# Patient Record
Sex: FEMALE | Race: WHITE | NOT HISPANIC OR LATINO | Employment: PART TIME | URBAN - METROPOLITAN AREA
[De-identification: names, ages, dates, MRNs, and addresses within clinical notes are randomized per-mention and may not be internally consistent; named-entity substitution may affect disease eponyms.]

---

## 2022-07-01 ENCOUNTER — OFFICE VISIT (OUTPATIENT)
Dept: URGENT CARE | Facility: MEDICAL CENTER | Age: 62
End: 2022-07-01
Payer: COMMERCIAL

## 2022-07-01 VITALS
BODY MASS INDEX: 36.24 KG/M2 | HEIGHT: 57 IN | OXYGEN SATURATION: 96 % | WEIGHT: 168 LBS | SYSTOLIC BLOOD PRESSURE: 158 MMHG | RESPIRATION RATE: 18 BRPM | DIASTOLIC BLOOD PRESSURE: 86 MMHG | HEART RATE: 103 BPM | TEMPERATURE: 101.9 F

## 2022-07-01 DIAGNOSIS — R53.83 FATIGUE, UNSPECIFIED TYPE: ICD-10-CM

## 2022-07-01 DIAGNOSIS — L03.312 CELLULITIS OF MID BACK REGION: ICD-10-CM

## 2022-07-01 DIAGNOSIS — R50.9 FEVER, UNSPECIFIED FEVER CAUSE: ICD-10-CM
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
## 2022-07-01 DIAGNOSIS — S20.461A INSECT BITE (NONVENOMOUS) OF RIGHT BACK WALL OF THORAX, INITIAL ENCOUNTER: Primary | ICD-10-CM

## 2022-07-01 PROCEDURE — U0003 INFECTIOUS AGENT DETECTION BY NUCLEIC ACID (DNA OR RNA); SEVERE ACUTE RESPIRATORY SYNDROME CORONAVIRUS 2 (SARS-COV-2) (CORONAVIRUS DISEASE [COVID-19]), AMPLIFIED PROBE TECHNIQUE, MAKING USE OF HIGH THROUGHPUT TECHNOLOGIES AS DESCRIBED BY CMS-2020-01-R: HCPCS | Performed by: PHYSICIAN ASSISTANT

## 2022-07-01 PROCEDURE — 99213 OFFICE O/P EST LOW 20 MIN: CPT | Performed by: PHYSICIAN ASSISTANT

## 2022-07-01 PROCEDURE — U0005 INFEC AGEN DETEC AMPLI PROBE: HCPCS | Performed by: PHYSICIAN ASSISTANT

## 2022-07-01 RX ORDER — DOXYCYCLINE HYCLATE 100 MG/1
100 CAPSULE ORAL EVERY 12 HOURS SCHEDULED
Qty: 20 CAPSULE | Refills: 0 | Status: SHIPPED | OUTPATIENT
Start: 2022-07-01 | End: 2022-07-11

## 2022-07-01 NOTE — PATIENT INSTRUCTIONS
1  Over-the-counter ibuprofen and/or acetaminophen as needed for pain and fever  2  Apply warm compresses to the affected area 3-4 times daily for 20-30 minutes until improved  3  Go to the ER immediately for any worsening symptoms  4  Follow-up with your primary care doctor in 1 week for any persistent symptoms    5  Quarantine pending the results of your COVID test

## 2022-07-01 NOTE — PROGRESS NOTES
[facility]        NAME: Kevin Clements is a 60 y o  female  : 1960    MRN: [id number]  DATE: 2022  TIME: 1:50 PM    Assessment and Plan   Insect bite (nonvenomous) of right back wall of thorax, initial encounter [S20 461A, W57  XXXA]  1  Insect bite (nonvenomous) of right back wall of thorax, initial encounter     2  Cellulitis of mid back region  doxycycline hyclate (VIBRAMYCIN) 100 mg capsule   3  Fever, unspecified fever cause  COVID Only -Office Collect   4  Fatigue, unspecified type  COVID Only -Office Collect         Patient Instructions   1  Over-the-counter ibuprofen and/or acetaminophen as needed for pain and fever  2  Apply warm compresses to the affected area 3-4 times daily for 20-30 minutes until improved  3  Go to the ER immediately for any worsening symptoms  4  Follow-up with your primary care doctor in 1 week for any persistent symptoms  5  Quarantine pending the results of your COVID test     Chief Complaint     Chief Complaint   Patient presents with    Insect Bite     Started Monday with a possible bug bite to the right lowerback, pt states she has been feeling achy, sweats and fatigued  Bump is raised  History of Present Illness       79-year-old female patient who noticed a small red area on the posterior part of her right torso 4 days ago  This occurred after sitting out on her deck  Since then the areas become bigger, more firm, slightly tender  She states coworkers pressed on the area yesterday and it emitted pus  Today patient developed fever, fatigue, body aches      Review of Systems   Review of Systems   Constitutional: Positive for chills, fatigue and fever  HENT: Negative for ear pain and sore throat  Eyes: Negative for pain and visual disturbance  Respiratory: Negative for cough and shortness of breath  Cardiovascular: Negative for chest pain and palpitations  Gastrointestinal: Negative for abdominal pain and vomiting  Genitourinary: Negative for dysuria and hematuria  Musculoskeletal: Negative for arthralgias and back pain  Skin: Positive for rash and wound  Negative for color change  Neurological: Negative for seizures and syncope  All other systems reviewed and are negative  Current Medications       Current Outpatient Medications:     doxycycline hyclate (VIBRAMYCIN) 100 mg capsule, Take 1 capsule (100 mg total) by mouth every 12 (twelve) hours for 10 days, Disp: 20 capsule, Rfl: 0    Current Allergies     Allergies as of 07/01/2022    (No Known Allergies)            The following portions of the patient's history were reviewed and updated as appropriate: allergies, current medications, past family history, past medical history, past social history, past surgical history and problem list      History reviewed  No pertinent past medical history  History reviewed  No pertinent surgical history  Family History   Problem Relation Age of Onset    No Known Problems Mother     No Known Problems Father          Medications have been verified  Objective   /86   Pulse 103   Temp (!) 101 9 °F (38 8 °C) (Oral)   Resp 18   Ht 4' 9" (1 448 m)   Wt 76 2 kg (168 lb)   SpO2 96%   BMI 36 35 kg/m²        Physical Exam     Physical Exam  Vitals and nursing note reviewed  Constitutional:       General: She is not in acute distress  Appearance: Normal appearance  She is not ill-appearing  HENT:      Head: Normocephalic  Nose: Nose normal       Mouth/Throat:      Mouth: Mucous membranes are moist       Pharynx: Oropharynx is clear  Eyes:      Conjunctiva/sclera: Conjunctivae normal       Pupils: Pupils are equal, round, and reactive to light  Cardiovascular:      Rate and Rhythm: Normal rate and regular rhythm  Pulses: Normal pulses  Heart sounds: Normal heart sounds  Pulmonary:      Effort: Pulmonary effort is normal    Abdominal:      Tenderness:  There is no abdominal tenderness  Musculoskeletal:         General: Normal range of motion  Cervical back: Normal range of motion  Skin:     General: Skin is warm and dry  Capillary Refill: Capillary refill takes less than 2 seconds  Comments: 4 cm x 2 cm oval, red, raised, indurated, warm rash noted to the right posterior thorax  Although no palpable fluctuance, there are several superficial pustules on the area as well  No red streaking or drainage noted  Neurological:      General: No focal deficit present  Mental Status: She is alert and oriented to person, place, and time  Psychiatric:         Mood and Affect: Mood normal          Behavior: Behavior normal            Medical decision making note:   I suspect the other symptoms are likely due to a cellulitis secondary to a previous bug bite as noted  However, will check a COVID swab due to the severity of the fever just to be certain

## 2022-07-02 LAB — SARS-COV-2 RNA RESP QL NAA+PROBE: NEGATIVE

## 2025-02-20 ENCOUNTER — APPOINTMENT (EMERGENCY)
Dept: RADIOLOGY | Facility: HOSPITAL | Age: 65
DRG: 494 | End: 2025-02-20
Payer: MEDICARE

## 2025-02-20 ENCOUNTER — HOSPITAL ENCOUNTER (INPATIENT)
Facility: HOSPITAL | Age: 65
LOS: 1 days | Discharge: HOME HEALTH | DRG: 494 | End: 2025-02-22
Attending: EMERGENCY MEDICINE | Admitting: SURGERY
Payer: MEDICARE

## 2025-02-20 DIAGNOSIS — S82.851A CLOSED TRIMALLEOLAR FRACTURE OF RIGHT ANKLE, INITIAL ENCOUNTER: Primary | ICD-10-CM

## 2025-02-20 LAB
ANION GAP SERPL CALCULATED.3IONS-SCNC: 13 MMOL/L (ref 4–13)
BASOPHILS # BLD AUTO: 0.04 THOUSANDS/ΜL (ref 0–0.1)
BASOPHILS NFR BLD AUTO: 1 % (ref 0–1)
BUN SERPL-MCNC: 12 MG/DL (ref 5–25)
CALCIUM SERPL-MCNC: 9.4 MG/DL (ref 8.4–10.2)
CHLORIDE SERPL-SCNC: 101 MMOL/L (ref 96–108)
CO2 SERPL-SCNC: 23 MMOL/L (ref 21–32)
CREAT SERPL-MCNC: 0.75 MG/DL (ref 0.6–1.3)
EOSINOPHIL # BLD AUTO: 0.14 THOUSAND/ΜL (ref 0–0.61)
EOSINOPHIL NFR BLD AUTO: 2 % (ref 0–6)
ERYTHROCYTE [DISTWIDTH] IN BLOOD BY AUTOMATED COUNT: 13.5 % (ref 11.6–15.1)
GFR SERPL CREATININE-BSD FRML MDRD: 83 ML/MIN/1.73SQ M
GLUCOSE SERPL-MCNC: 98 MG/DL (ref 65–140)
HCT VFR BLD AUTO: 44.5 % (ref 34.8–46.1)
HGB BLD-MCNC: 14.8 G/DL (ref 11.5–15.4)
IMM GRANULOCYTES # BLD AUTO: 0.02 THOUSAND/UL (ref 0–0.2)
IMM GRANULOCYTES NFR BLD AUTO: 0 % (ref 0–2)
LYMPHOCYTES # BLD AUTO: 2.84 THOUSANDS/ΜL (ref 0.6–4.47)
LYMPHOCYTES NFR BLD AUTO: 34 % (ref 14–44)
MCH RBC QN AUTO: 32.8 PG (ref 26.8–34.3)
MCHC RBC AUTO-ENTMCNC: 33.3 G/DL (ref 31.4–37.4)
MCV RBC AUTO: 99 FL (ref 82–98)
MONOCYTES # BLD AUTO: 0.97 THOUSAND/ΜL (ref 0.17–1.22)
MONOCYTES NFR BLD AUTO: 12 % (ref 4–12)
NEUTROPHILS # BLD AUTO: 4.24 THOUSANDS/ΜL (ref 1.85–7.62)
NEUTS SEG NFR BLD AUTO: 51 % (ref 43–75)
NRBC BLD AUTO-RTO: 0 /100 WBCS
PLATELET # BLD AUTO: 162 THOUSANDS/UL (ref 149–390)
PMV BLD AUTO: 10.3 FL (ref 8.9–12.7)
POTASSIUM SERPL-SCNC: 4.5 MMOL/L (ref 3.5–5.3)
RBC # BLD AUTO: 4.51 MILLION/UL (ref 3.81–5.12)
SODIUM SERPL-SCNC: 137 MMOL/L (ref 135–147)
WBC # BLD AUTO: 8.25 THOUSAND/UL (ref 4.31–10.16)

## 2025-02-20 PROCEDURE — 80048 BASIC METABOLIC PNL TOTAL CA: CPT

## 2025-02-20 PROCEDURE — 99284 EMERGENCY DEPT VISIT MOD MDM: CPT

## 2025-02-20 PROCEDURE — 85025 COMPLETE CBC W/AUTO DIFF WBC: CPT

## 2025-02-20 PROCEDURE — 73590 X-RAY EXAM OF LOWER LEG: CPT

## 2025-02-20 PROCEDURE — 36415 COLL VENOUS BLD VENIPUNCTURE: CPT

## 2025-02-20 PROCEDURE — 73610 X-RAY EXAM OF ANKLE: CPT

## 2025-02-20 PROCEDURE — 96374 THER/PROPH/DIAG INJ IV PUSH: CPT

## 2025-02-20 RX ORDER — FENTANYL CITRATE 50 UG/ML
75 INJECTION, SOLUTION INTRAMUSCULAR; INTRAVENOUS ONCE
Refills: 0 | Status: COMPLETED | OUTPATIENT
Start: 2025-02-20 | End: 2025-02-20

## 2025-02-20 RX ORDER — PROPOFOL 10 MG/ML
200 INJECTION, EMULSION INTRAVENOUS ONCE
Status: COMPLETED | OUTPATIENT
Start: 2025-02-20 | End: 2025-02-21

## 2025-02-20 RX ADMIN — FENTANYL CITRATE 75 MCG: 50 INJECTION, SOLUTION INTRAMUSCULAR; INTRAVENOUS at 21:36

## 2025-02-21 ENCOUNTER — APPOINTMENT (INPATIENT)
Dept: RADIOLOGY | Facility: HOSPITAL | Age: 65
DRG: 494 | End: 2025-02-21
Payer: MEDICARE

## 2025-02-21 ENCOUNTER — APPOINTMENT (EMERGENCY)
Dept: RADIOLOGY | Facility: HOSPITAL | Age: 65
DRG: 494 | End: 2025-02-21
Payer: MEDICARE

## 2025-02-21 ENCOUNTER — ANESTHESIA (INPATIENT)
Dept: PERIOP | Facility: HOSPITAL | Age: 65
DRG: 494 | End: 2025-02-21
Payer: MEDICARE

## 2025-02-21 ENCOUNTER — ANESTHESIA EVENT (INPATIENT)
Dept: PERIOP | Facility: HOSPITAL | Age: 65
DRG: 494 | End: 2025-02-21
Payer: MEDICARE

## 2025-02-21 PROBLEM — G89.11 ACUTE PAIN DUE TO TRAUMA: Status: ACTIVE | Noted: 2025-02-21
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
## 2025-02-21 PROBLEM — S82.851D CLOSED TRIMALLEOLAR FRACTURE OF RIGHT ANKLE WITH ROUTINE HEALING: Status: ACTIVE | Noted: 2025-02-21

## 2025-02-21 LAB
ABO GROUP BLD: NORMAL
ABO GROUP BLD: NORMAL
ANION GAP SERPL CALCULATED.3IONS-SCNC: 11 MMOL/L (ref 4–13)
ATRIAL RATE: 89 BPM
ATRIAL RATE: 90 BPM
BLD GP AB SCN SERPL QL: NEGATIVE
BUN SERPL-MCNC: 11 MG/DL (ref 5–25)
CALCIUM SERPL-MCNC: 8.9 MG/DL (ref 8.4–10.2)
CHLORIDE SERPL-SCNC: 105 MMOL/L (ref 96–108)
CO2 SERPL-SCNC: 22 MMOL/L (ref 21–32)
CREAT SERPL-MCNC: 0.6 MG/DL (ref 0.6–1.3)
ERYTHROCYTE [DISTWIDTH] IN BLOOD BY AUTOMATED COUNT: 13.7 % (ref 11.6–15.1)
GFR SERPL CREATININE-BSD FRML MDRD: 95 ML/MIN/1.73SQ M
GLUCOSE SERPL-MCNC: 105 MG/DL (ref 65–140)
HCT VFR BLD AUTO: 42.2 % (ref 34.8–46.1)
HGB BLD-MCNC: 14.1 G/DL (ref 11.5–15.4)
INR PPP: 1.01 (ref 0.85–1.19)
MCH RBC QN AUTO: 32.9 PG (ref 26.8–34.3)
MCHC RBC AUTO-ENTMCNC: 33.4 G/DL (ref 31.4–37.4)
MCV RBC AUTO: 99 FL (ref 82–98)
P AXIS: 77 DEGREES
P AXIS: 77 DEGREES
PLATELET # BLD AUTO: 153 THOUSANDS/UL (ref 149–390)
PMV BLD AUTO: 10.4 FL (ref 8.9–12.7)
POTASSIUM SERPL-SCNC: 4.1 MMOL/L (ref 3.5–5.3)
PR INTERVAL: 164 MS
PR INTERVAL: 164 MS
PROTHROMBIN TIME: 14 SECONDS (ref 12.3–15)
QRS AXIS: -39 DEGREES
QRS AXIS: -41 DEGREES
QRSD INTERVAL: 90 MS
QRSD INTERVAL: 90 MS
QT INTERVAL: 388 MS
QT INTERVAL: 392 MS
QTC INTERVAL: 473 MS
QTC INTERVAL: 480 MS
RBC # BLD AUTO: 4.28 MILLION/UL (ref 3.81–5.12)
RH BLD: NEGATIVE
RH BLD: NEGATIVE
SODIUM SERPL-SCNC: 138 MMOL/L (ref 135–147)
SPECIMEN EXPIRATION DATE: NORMAL
T WAVE AXIS: 72 DEGREES
T WAVE AXIS: 73 DEGREES
VENTRICULAR RATE: 89 BPM
VENTRICULAR RATE: 90 BPM
WBC # BLD AUTO: 8.86 THOUSAND/UL (ref 4.31–10.16)

## 2025-02-21 PROCEDURE — C1713 ANCHOR/SCREW BN/BN,TIS/BN: HCPCS | Performed by: STUDENT IN AN ORGANIZED HEALTH CARE EDUCATION/TRAINING PROGRAM

## 2025-02-21 PROCEDURE — 86850 RBC ANTIBODY SCREEN: CPT

## 2025-02-21 PROCEDURE — 80048 BASIC METABOLIC PNL TOTAL CA: CPT

## 2025-02-21 PROCEDURE — 96376 TX/PRO/DX INJ SAME DRUG ADON: CPT

## 2025-02-21 PROCEDURE — 73610 X-RAY EXAM OF ANKLE: CPT

## 2025-02-21 PROCEDURE — NC001 PR NO CHARGE: Performed by: SURGERY

## 2025-02-21 PROCEDURE — 73600 X-RAY EXAM OF ANKLE: CPT

## 2025-02-21 PROCEDURE — 86900 BLOOD TYPING SEROLOGIC ABO: CPT

## 2025-02-21 PROCEDURE — 85610 PROTHROMBIN TIME: CPT | Performed by: PHYSICIAN ASSISTANT

## 2025-02-21 PROCEDURE — 93010 ELECTROCARDIOGRAM REPORT: CPT | Performed by: INTERNAL MEDICINE

## 2025-02-21 PROCEDURE — 71045 X-RAY EXAM CHEST 1 VIEW: CPT

## 2025-02-21 PROCEDURE — 99223 1ST HOSP IP/OBS HIGH 75: CPT | Performed by: STUDENT IN AN ORGANIZED HEALTH CARE EDUCATION/TRAINING PROGRAM

## 2025-02-21 PROCEDURE — 93005 ELECTROCARDIOGRAM TRACING: CPT

## 2025-02-21 PROCEDURE — 85027 COMPLETE CBC AUTOMATED: CPT

## 2025-02-21 PROCEDURE — NC001 PR NO CHARGE: Performed by: PHYSICIAN ASSISTANT

## 2025-02-21 PROCEDURE — 99223 1ST HOSP IP/OBS HIGH 75: CPT | Performed by: SURGERY

## 2025-02-21 PROCEDURE — 86901 BLOOD TYPING SEROLOGIC RH(D): CPT

## 2025-02-21 PROCEDURE — 0QSG04Z REPOSITION RIGHT TIBIA WITH INTERNAL FIXATION DEVICE, OPEN APPROACH: ICD-10-PCS | Performed by: STUDENT IN AN ORGANIZED HEALTH CARE EDUCATION/TRAINING PROGRAM

## 2025-02-21 PROCEDURE — 0QSJ04Z REPOSITION RIGHT FIBULA WITH INTERNAL FIXATION DEVICE, OPEN APPROACH: ICD-10-PCS | Performed by: STUDENT IN AN ORGANIZED HEALTH CARE EDUCATION/TRAINING PROGRAM

## 2025-02-21 PROCEDURE — 27823 TREATMENT OF ANKLE FRACTURE: CPT | Performed by: STUDENT IN AN ORGANIZED HEALTH CARE EDUCATION/TRAINING PROGRAM

## 2025-02-21 DEVICE — ONE-THIRD TUBULAR PLATE: Type: IMPLANTABLE DEVICE | Site: ANKLE | Status: FUNCTIONAL

## 2025-02-21 DEVICE — LOCKING SCREW, FULLY THREADED,T8
Type: IMPLANTABLE DEVICE | Site: ANKLE | Status: FUNCTIONAL
Brand: VARIAX

## 2025-02-21 DEVICE — BONE SCREW, T8
Type: IMPLANTABLE DEVICE | Site: ANKLE | Status: FUNCTIONAL
Brand: VARIAX

## 2025-02-21 DEVICE — BONE SCREW, FULLY THREADED, T8
Type: IMPLANTABLE DEVICE | Site: ANKLE | Status: FUNCTIONAL
Brand: VARIAX

## 2025-02-21 DEVICE — BONE SCREW, FULLY THREADED,T10
Type: IMPLANTABLE DEVICE | Site: ANKLE | Status: FUNCTIONAL
Brand: VARIAX

## 2025-02-21 DEVICE — BONE SCREW
Type: IMPLANTABLE DEVICE | Site: ANKLE | Status: FUNCTIONAL
Brand: VARIAX

## 2025-02-21 DEVICE — CANNULATED SCREW
Type: IMPLANTABLE DEVICE | Site: ANKLE | Status: FUNCTIONAL
Brand: ASNIS

## 2025-02-21 RX ORDER — ONDANSETRON 2 MG/ML
4 INJECTION INTRAMUSCULAR; INTRAVENOUS EVERY 6 HOURS PRN
Status: DISCONTINUED | OUTPATIENT
Start: 2025-02-21 | End: 2025-02-22 | Stop reason: HOSPADM

## 2025-02-21 RX ORDER — SODIUM CHLORIDE, SODIUM LACTATE, POTASSIUM CHLORIDE, CALCIUM CHLORIDE 600; 310; 30; 20 MG/100ML; MG/100ML; MG/100ML; MG/100ML
INJECTION, SOLUTION INTRAVENOUS CONTINUOUS PRN
Status: DISCONTINUED | OUTPATIENT
Start: 2025-02-21 | End: 2025-02-21

## 2025-02-21 RX ORDER — DEXAMETHASONE SODIUM PHOSPHATE 10 MG/ML
INJECTION, SOLUTION INTRAMUSCULAR; INTRAVENOUS AS NEEDED
Status: DISCONTINUED | OUTPATIENT
Start: 2025-02-21 | End: 2025-02-21

## 2025-02-21 RX ORDER — ACETAMINOPHEN 325 MG/1
975 TABLET ORAL EVERY 8 HOURS SCHEDULED
Status: DISCONTINUED | OUTPATIENT
Start: 2025-02-21 | End: 2025-02-22 | Stop reason: HOSPADM

## 2025-02-21 RX ORDER — VANCOMYCIN HYDROCHLORIDE 1 G/20ML
INJECTION, POWDER, LYOPHILIZED, FOR SOLUTION INTRAVENOUS AS NEEDED
Status: DISCONTINUED | OUTPATIENT
Start: 2025-02-21 | End: 2025-02-21 | Stop reason: HOSPADM

## 2025-02-21 RX ORDER — METOCLOPRAMIDE HYDROCHLORIDE 5 MG/ML
10 INJECTION INTRAMUSCULAR; INTRAVENOUS ONCE AS NEEDED
Status: DISCONTINUED | OUTPATIENT
Start: 2025-02-21 | End: 2025-02-21 | Stop reason: HOSPADM

## 2025-02-21 RX ORDER — FENTANYL CITRATE/PF 50 MCG/ML
25 SYRINGE (ML) INJECTION
Status: DISCONTINUED | OUTPATIENT
Start: 2025-02-21 | End: 2025-02-21 | Stop reason: HOSPADM

## 2025-02-21 RX ORDER — FENTANYL CITRATE 50 UG/ML
INJECTION, SOLUTION INTRAMUSCULAR; INTRAVENOUS AS NEEDED
Status: DISCONTINUED | OUTPATIENT
Start: 2025-02-21 | End: 2025-02-21

## 2025-02-21 RX ORDER — CEFAZOLIN SODIUM 2 G/50ML
2000 SOLUTION INTRAVENOUS
Status: COMPLETED | OUTPATIENT
Start: 2025-02-21 | End: 2025-02-21

## 2025-02-21 RX ORDER — PROPOFOL 10 MG/ML
INJECTION, EMULSION INTRAVENOUS AS NEEDED
Status: DISCONTINUED | OUTPATIENT
Start: 2025-02-21 | End: 2025-02-21

## 2025-02-21 RX ORDER — CEFAZOLIN SODIUM 2 G/50ML
2000 SOLUTION INTRAVENOUS EVERY 8 HOURS
Status: COMPLETED | OUTPATIENT
Start: 2025-02-21 | End: 2025-02-22

## 2025-02-21 RX ORDER — MIDAZOLAM HYDROCHLORIDE 2 MG/2ML
INJECTION, SOLUTION INTRAMUSCULAR; INTRAVENOUS AS NEEDED
Status: DISCONTINUED | OUTPATIENT
Start: 2025-02-21 | End: 2025-02-21

## 2025-02-21 RX ORDER — SENNOSIDES 8.6 MG
2 TABLET ORAL DAILY
Status: DISCONTINUED | OUTPATIENT
Start: 2025-02-21 | End: 2025-02-22 | Stop reason: HOSPADM

## 2025-02-21 RX ORDER — PROMETHAZINE HYDROCHLORIDE 25 MG/ML
25 INJECTION, SOLUTION INTRAMUSCULAR; INTRAVENOUS ONCE AS NEEDED
Status: DISCONTINUED | OUTPATIENT
Start: 2025-02-21 | End: 2025-02-21 | Stop reason: HOSPADM

## 2025-02-21 RX ORDER — BUPIVACAINE HYDROCHLORIDE 2.5 MG/ML
INJECTION, SOLUTION EPIDURAL; INFILTRATION; INTRACAUDAL
Status: COMPLETED | OUTPATIENT
Start: 2025-02-21 | End: 2025-02-21

## 2025-02-21 RX ORDER — LIDOCAINE HYDROCHLORIDE 10 MG/ML
INJECTION, SOLUTION EPIDURAL; INFILTRATION; INTRACAUDAL; PERINEURAL AS NEEDED
Status: DISCONTINUED | OUTPATIENT
Start: 2025-02-21 | End: 2025-02-21

## 2025-02-21 RX ORDER — ENOXAPARIN SODIUM 100 MG/ML
30 INJECTION SUBCUTANEOUS EVERY 12 HOURS
Status: DISCONTINUED | OUTPATIENT
Start: 2025-02-21 | End: 2025-02-22 | Stop reason: HOSPADM

## 2025-02-21 RX ORDER — POLYETHYLENE GLYCOL 3350 17 G/17G
17 POWDER, FOR SOLUTION ORAL DAILY
Status: DISCONTINUED | OUTPATIENT
Start: 2025-02-21 | End: 2025-02-22 | Stop reason: HOSPADM

## 2025-02-21 RX ORDER — FENTANYL CITRATE 50 UG/ML
50 INJECTION, SOLUTION INTRAMUSCULAR; INTRAVENOUS ONCE
Refills: 0 | Status: COMPLETED | OUTPATIENT
Start: 2025-02-21 | End: 2025-02-21

## 2025-02-21 RX ORDER — HYDROMORPHONE HCL IN WATER/PF 6 MG/30 ML
0.2 PATIENT CONTROLLED ANALGESIA SYRINGE INTRAVENOUS EVERY 2 HOUR PRN
Refills: 0 | Status: DISCONTINUED | OUTPATIENT
Start: 2025-02-21 | End: 2025-02-22 | Stop reason: HOSPADM

## 2025-02-21 RX ORDER — DOCUSATE SODIUM 100 MG/1
100 CAPSULE, LIQUID FILLED ORAL 2 TIMES DAILY
Status: DISCONTINUED | OUTPATIENT
Start: 2025-02-21 | End: 2025-02-22 | Stop reason: HOSPADM

## 2025-02-21 RX ORDER — OXYCODONE HYDROCHLORIDE 5 MG/1
5 TABLET ORAL EVERY 4 HOURS PRN
Refills: 0 | Status: DISCONTINUED | OUTPATIENT
Start: 2025-02-21 | End: 2025-02-22 | Stop reason: HOSPADM

## 2025-02-21 RX ORDER — HYDROMORPHONE HCL/PF 1 MG/ML
0.5 SYRINGE (ML) INJECTION
Status: DISCONTINUED | OUTPATIENT
Start: 2025-02-21 | End: 2025-02-21 | Stop reason: HOSPADM

## 2025-02-21 RX ORDER — BUPIVACAINE HYDROCHLORIDE 5 MG/ML
INJECTION, SOLUTION EPIDURAL; INTRACAUDAL
Status: COMPLETED | OUTPATIENT
Start: 2025-02-21 | End: 2025-02-21

## 2025-02-21 RX ORDER — ROCURONIUM BROMIDE 10 MG/ML
INJECTION, SOLUTION INTRAVENOUS AS NEEDED
Status: DISCONTINUED | OUTPATIENT
Start: 2025-02-21 | End: 2025-02-21

## 2025-02-21 RX ORDER — SODIUM CHLORIDE, SODIUM GLUCONATE, SODIUM ACETATE, POTASSIUM CHLORIDE, MAGNESIUM CHLORIDE, SODIUM PHOSPHATE, DIBASIC, AND POTASSIUM PHOSPHATE .53; .5; .37; .037; .03; .012; .00082 G/100ML; G/100ML; G/100ML; G/100ML; G/100ML; G/100ML; G/100ML
100 INJECTION, SOLUTION INTRAVENOUS CONTINUOUS
Status: DISCONTINUED | OUTPATIENT
Start: 2025-02-21 | End: 2025-02-21

## 2025-02-21 RX ADMIN — ACETAMINOPHEN 975 MG: 325 TABLET, FILM COATED ORAL at 05:35

## 2025-02-21 RX ADMIN — MIDAZOLAM 2 MG: 1 INJECTION INTRAMUSCULAR; INTRAVENOUS at 10:00

## 2025-02-21 RX ADMIN — ROCURONIUM 20 MG: 50 INJECTION, SOLUTION INTRAVENOUS at 12:11

## 2025-02-21 RX ADMIN — OXYCODONE HYDROCHLORIDE 5 MG: 5 TABLET ORAL at 04:28

## 2025-02-21 RX ADMIN — BUPIVACAINE HYDROCHLORIDE 10 ML: 5 INJECTION, SOLUTION EPIDURAL; INTRACAUDAL; PERINEURAL at 10:05

## 2025-02-21 RX ADMIN — FENTANYL CITRATE 50 MCG: 50 INJECTION, SOLUTION INTRAMUSCULAR; INTRAVENOUS at 00:18

## 2025-02-21 RX ADMIN — CEFAZOLIN SODIUM 2000 MG: 2 SOLUTION INTRAVENOUS at 10:40

## 2025-02-21 RX ADMIN — ROCURONIUM 30 MG: 50 INJECTION, SOLUTION INTRAVENOUS at 11:02

## 2025-02-21 RX ADMIN — ACETAMINOPHEN 975 MG: 325 TABLET, FILM COATED ORAL at 20:53

## 2025-02-21 RX ADMIN — FENTANYL CITRATE 50 MCG: 50 INJECTION, SOLUTION INTRAMUSCULAR; INTRAVENOUS at 10:14

## 2025-02-21 RX ADMIN — PROPOFOL 60 MG: 10 INJECTION, EMULSION INTRAVENOUS at 00:04

## 2025-02-21 RX ADMIN — SUGAMMADEX 200 MG: 100 INJECTION, SOLUTION INTRAVENOUS at 13:06

## 2025-02-21 RX ADMIN — ROCURONIUM 50 MG: 50 INJECTION, SOLUTION INTRAVENOUS at 10:14

## 2025-02-21 RX ADMIN — SODIUM CHLORIDE, SODIUM LACTATE, POTASSIUM CHLORIDE, AND CALCIUM CHLORIDE: .6; .31; .03; .02 INJECTION, SOLUTION INTRAVENOUS at 10:09

## 2025-02-21 RX ADMIN — DEXAMETHASONE SODIUM PHOSPHATE 10 MG: 10 INJECTION INTRAMUSCULAR; INTRAVENOUS at 10:14

## 2025-02-21 RX ADMIN — DOCUSATE SODIUM 100 MG: 100 CAPSULE, LIQUID FILLED ORAL at 16:30

## 2025-02-21 RX ADMIN — PROPOFOL 50 MG: 10 INJECTION, EMULSION INTRAVENOUS at 12:25

## 2025-02-21 RX ADMIN — ENOXAPARIN SODIUM 30 MG: 30 INJECTION SUBCUTANEOUS at 16:30

## 2025-02-21 RX ADMIN — FENTANYL CITRATE 50 MCG: 50 INJECTION, SOLUTION INTRAMUSCULAR; INTRAVENOUS at 11:02

## 2025-02-21 RX ADMIN — PROPOFOL 200 MG: 10 INJECTION, EMULSION INTRAVENOUS at 10:14

## 2025-02-21 RX ADMIN — ONDANSETRON 4 MG: 2 INJECTION INTRAMUSCULAR; INTRAVENOUS at 12:52

## 2025-02-21 RX ADMIN — PROPOFOL 50 MG: 10 INJECTION, EMULSION INTRAVENOUS at 12:38

## 2025-02-21 RX ADMIN — BUPIVACAINE 10 ML: 13.3 INJECTION, SUSPENSION, LIPOSOMAL INFILTRATION at 10:05

## 2025-02-21 RX ADMIN — CEFAZOLIN SODIUM 2000 MG: 2 SOLUTION INTRAVENOUS at 20:49

## 2025-02-21 RX ADMIN — LIDOCAINE HYDROCHLORIDE 40 MG: 10 INJECTION, SOLUTION EPIDURAL; INFILTRATION; INTRACAUDAL; PERINEURAL at 10:14

## 2025-02-21 RX ADMIN — BUPIVACAINE HYDROCHLORIDE 10 ML: 2.5 INJECTION, SOLUTION EPIDURAL; INFILTRATION; INTRACAUDAL; PERINEURAL at 10:00

## 2025-02-21 RX ADMIN — ENOXAPARIN SODIUM 30 MG: 30 INJECTION SUBCUTANEOUS at 05:35

## 2025-02-21 RX ADMIN — SODIUM CHLORIDE, SODIUM GLUCONATE, SODIUM ACETATE, POTASSIUM CHLORIDE, MAGNESIUM CHLORIDE, SODIUM PHOSPHATE, DIBASIC, AND POTASSIUM PHOSPHATE 100 ML/HR: .53; .5; .37; .037; .03; .012; .00082 INJECTION, SOLUTION INTRAVENOUS at 01:21

## 2025-02-21 RX ADMIN — SODIUM CHLORIDE, SODIUM GLUCONATE, SODIUM ACETATE, POTASSIUM CHLORIDE, MAGNESIUM CHLORIDE, SODIUM PHOSPHATE, DIBASIC, AND POTASSIUM PHOSPHATE 100 ML/HR: .53; .5; .37; .037; .03; .012; .00082 INJECTION, SOLUTION INTRAVENOUS at 14:49

## 2025-02-21 RX ADMIN — HYDROMORPHONE HYDROCHLORIDE 0.5 MG: 0.2 INJECTION, SOLUTION INTRAMUSCULAR; INTRAVENOUS; SUBCUTANEOUS at 12:41

## 2025-02-21 RX ADMIN — SODIUM CHLORIDE, SODIUM LACTATE, POTASSIUM CHLORIDE, AND CALCIUM CHLORIDE: .6; .31; .03; .02 INJECTION, SOLUTION INTRAVENOUS at 12:21

## 2025-02-21 NOTE — PROGRESS NOTES
Trauma Service Post-operative Note - Trauma   Name: Dagmar La 64 y.o. female I MRN: [id number]  Unit/Bed#: S -01 I Date of Admission: 2/20/2025   Date of Service: 2/21/2025 I Hospital Day: 0     Assessment & Plan  Fall  - Status post slip and fall on ice with the below noted injuries.  - Fall precautions.  - PT and OT evaluation and treatment as indicated.  - Case Management consultation for disposition planning.  Closed trimalleolar fracture of right ankle with routine healing  - Acute closed trimalleolar fracture of the right ankle, present on admission.  - Status post reduction and splinting by the emergency department staff on 2/20/2025.  - Appreciate Orthopedic surgery evaluation, recommendations and interventions as noted.  - Status post ORIF of the right trimalleolar ankle fracture with fixation of the posterior lip on 2/21/2025.    - Maintain NON-weightbearing status on the right lower extremity in splint postoperatively.  - Monitor right lower extremity neurovascular exam.  - Continue multimodal analgesic regimen.  - Continue DVT prophylaxis.  - Per Orthopedic surgery: Patient should remain on Lovenox during hospital encounter, but may be transition to aspirin 81 mg twice daily for 6 weeks on discharge.  - PT and OT evaluation and treatment as indicated.  - Outpatient follow up with Orthopedic surgery for re-evaluation.  Acute pain due to trauma  - Acute pain secondary to traumatic injuries.  - Continue multimodal analgesic regimen.  - Bowel regimen as long as using opioids.  - Continue to monitor pain and adjust regimen as indicated.        Subjective/Objective     Subjective: Patient is doing well postoperatively.  She notes virtually no ankle pain at this time and feels like her nerve block is working well.  She has tolerated some oral intake postoperatively without any nausea or vomiting.  She has no other complaints at this time.  Nursing staff notes she has been doing well  "postoperatively.    Objective:     Blood pressure 122/70, pulse 79, temperature 98.1 °F (36.7 °C), resp. rate 19, height 4' 9\" (1.448 m), weight 80.7 kg (178 lb), SpO2 92%.,Body mass index is 38.52 kg/m².      Intake/Output Summary (Last 24 hours) at 2/21/2025 1645  Last data filed at 2/21/2025 1316  Gross per 24 hour   Intake 1300 ml   Output 200 ml   Net 1100 ml       Invasive Devices       Peripheral Intravenous Line  Duration             Peripheral IV 02/20/25 Right Antecubital <1 day                    Physical Exam:    GENERAL APPEARANCE: Patient in no acute distress.  HEENT: NCAT; EOMs intact; Mucous membranes moist  CV: Regular rate and rhythm; + S1, S2; no murmur/gallops/rubs appreciated.  LUNGS: Clear to auscultation; no wheezes/rales/rhonci.  Breathing comfortably on room air.  ABD: NABS; soft; non-distended; non-tender.  EXT: +2 pulses bilaterally upper & lower extremities; no clubbing/cyanosis.  Patient with no significant right ankle tenderness at this time status post nerve block during operative intervention.  Right lower extremity splint and overlying dressings are intact and appearing to be in appropriate position.  Patient does have a palpable right DP pulse with some swelling in the foot with motor function intact.  NEURO: GCS 15; no focal neurologic deficits; neurovascularly intact.  SKIN: Warm, dry and well perfused; no rash; no jaundice.                Tigre Adams PA-C  2/21/2025  4:21 PM  "

## 2025-02-21 NOTE — ANESTHESIA PREPROCEDURE EVALUATION
"Procedure:  OPEN REDUCTION W/ INTERNAL FIXATION (ORIF) ANKLE and all associated procedures (Right: Ankle)     - denies any chest pain, palpitations, shortness of breath, syncope, lightheadedness, seizures   - denies any recent infectious symptoms such as fevers, chills, cough   - denies taking any anticoagulation medications or any issues with bleeding, bruising, clotting    Relevant Problems   ANESTHESIA (within normal limits)      CARDIO (within normal limits)      ENDO (within normal limits)      GI/HEPATIC (within normal limits)      /RENAL (within normal limits)      GYN (within normal limits)      HEMATOLOGY (within normal limits)      MUSCULOSKELETAL (within normal limits)      NEURO/PSYCH (within normal limits)      PULMONARY (within normal limits)      Rheumatology   (+) Closed trimalleolar fracture of right ankle with routine healing      Lab Results   Component Value Date    WBC 8.86 02/21/2025    HGB 14.1 02/21/2025    HCT 42.2 02/21/2025    MCV 99 (H) 02/21/2025     02/21/2025     Lab Results   Component Value Date    SODIUM 138 02/21/2025    K 4.1 02/21/2025     02/21/2025    CO2 22 02/21/2025    AGAP 11 02/21/2025    BUN 11 02/21/2025    CREATININE 0.60 02/21/2025    GLUC 105 02/21/2025    CALCIUM 8.9 02/21/2025    EGFR 95 02/21/2025     No results found for: \"PTT\"  Lab Results   Component Value Date    INR 1.01 02/21/2025    PROTIME 14.0 02/21/2025       Physical Exam    Airway    Mallampati score: II  TM Distance: >3 FB  Neck ROM: full     Dental       Cardiovascular  Rhythm: regular, Rate: normal, Cardiovascular exam normal    Pulmonary  Pulmonary exam normal     Other Findings  post-pubertal.      Anesthesia Plan  ASA Score- 2     Anesthesia Type- general with ASA Monitors.         Additional Monitors:     Airway Plan: ETT.    Comment: GA with ETT, preoperative nerve blocks.       Plan Factors-Exercise tolerance (METS): >4 METS.    Chart reviewed. EKG reviewed. Imaging results " reviewed. Existing labs reviewed. Patient summary reviewed.          Obstructive sleep apnea risk education given perioperatively.        Induction- intravenous.    Postoperative Plan- Plan for postoperative opioid use.     Perioperative Resuscitation Plan - Level 1 - Full Code.       Informed Consent- Anesthetic plan and risks discussed with patient.  I personally reviewed this patient with the CRNA. Discussed and agreed on the Anesthesia Plan with the CRNA..      NPO Status:  No vitals data found for the desired time range.

## 2025-02-21 NOTE — PHYSICAL THERAPY NOTE
Physical Therapy Cancellation Note             02/21/25 0753   Note Type   Note type Cancelled Session   Cancel Reasons Patient to operating room   Additional Comments Pt planned for OR for fixation of LE fracture. Will hold PT eval and f/u as appropriate. Please place new WBS and activity orders, post op.     Benjamín Navarrete, PT

## 2025-02-21 NOTE — ANESTHESIA POSTPROCEDURE EVALUATION
Post-Op Assessment Note    CV Status:  Stable  Pain Score: 0    Pain management: adequate       Mental Status:  Alert and awake   Hydration Status:  Euvolemic   PONV Controlled:  Controlled   Airway Patency:  Patent     Post Op Vitals Reviewed: Yes    No anethesia notable event occurred.    Staff: Anesthesiologist, CRNA           Last Filed PACU Vitals:  Vitals Value Taken Time   Temp 98.7    Pulse 100    /79    Resp 16    SpO2 97

## 2025-02-21 NOTE — ASSESSMENT & PLAN NOTE
- Acute closed trimalleolar fracture of the right ankle, present on admission.  - Status post reduction and splinting by the emergency department staff on 2/20/2025.  - Appreciate Orthopedic surgery evaluation, recommendations and interventions as noted.   - Plan for OR with orthopedic surgery today.  - From a trauma standpoint, patient is medically appropriate to proceed with operative intervention without further preoperative workup required.  - Maintain NON-weightbearing status on the right lower extremity in splint.  - Monitor right lower extremity neurovascular exam.  - Continue multimodal analgesic regimen.  - Continue DVT prophylaxis.  - PT and OT evaluation and treatment as indicated.  - Outpatient follow up with Orthopedic surgery for re-evaluation.

## 2025-02-21 NOTE — CONSULTS
Consultation - Orthopedics   Name: Dagmar La 64 y.o. female I MRN: [id number]  Unit/Bed#: S -01 I Date of Admission: 2/20/2025   Date of Service: 2/21/2025 I Hospital Day: 0   Inpatient consult to Orthopedic Surgery  Consult performed by: Roger Samuel PA-C  Consult ordered by: Sylvia Webb MD        Physician Requesting Evaluation: Alonso Hernandez,*   Reason for Evaluation / Principal Problem: Right trimalleolar ankle fracture    Assessment & Plan  Closed trimalleolar fracture of right ankle with routine healing  NWB to right lower extremity in splint  To OR today for ORIF right ankle with Dr. Kingston  NPO status confirmed   Preoperative antibiotics ordered  Intraoperative TXA ordered  Cleared for trauma for or today  All labs including CBC, BMP, type and screen, INR all reviewed preoperatively.  Chest x-ray, EKG all reviewed.  Consented obtained and on file in OR  Will monitor for ABLA and administer IVF/prbc as indicated for Greater than 2 gram drop or Hgb < 7.  Patient hemoglobin currently at 14.1  PT/OT as tolerated  Incentive spirometry  Pain control per primary team  DVT ppx  : Recommend aspirin 81 Mg twice daily for 6 weeks upon discharge  All other medical comorbidities to be managed per primary team  Dispo: Ortho will follow  See above for additional details   Case reviewed and discussed with Dr. Kingston      Orthopedics service will follow.  Please contact the SecureChat role for the Orthopedics service with any questions/concerns.    History of Present Illness   HPI: Dagmar La is a 64 y.o. year old female with no significant past medical history who presents status post right trimalleolar ankle fracture.  Patient states last night around 8:30 PM she was walking outside and slipped and fell on ice.  She states immediately after the fall she felt pain and inability to bear weight on the right lower extremity, specifically the right ankle.  She presented to the [facility] emergency  department to which she was found to have a right trimalleolar ankle fracture for which orthopedics has been engaged.  Patient states most of her pain is concentrated around the right ankle joint exacerbated with any range of motion attempt of the ankle.  Patient denies any previous surgeries or injuries to the right lower extremity.  She ambulates at baseline with no assistive devices.  She offers no other acute complaints besides the right ankle at time of examination.  Denies any new numbness or paresthesias.  Denies any fevers or chills.    Review of Systems   Constitutional:  Negative for activity change, appetite change and chills.   HENT:  Negative for congestion, ear discharge and ear pain.    Eyes:  Negative for pain, discharge and itching.   Respiratory:  Negative for apnea, chest tightness and shortness of breath.    Cardiovascular:  Negative for chest pain and leg swelling.   Gastrointestinal:  Negative for abdominal distention and abdominal pain.   Genitourinary:  Negative for difficulty urinating, dyspareunia and dysuria.   Musculoskeletal:  Positive for arthralgias and gait problem.   Neurological:  Negative for dizziness, facial asymmetry, speech difficulty and headaches.   Psychiatric/Behavioral:  Negative for agitation, behavioral problems and confusion.     significant for findings described in the HPI.  Historical Information   History reviewed. No pertinent past medical history.  History reviewed. No pertinent surgical history.  Social History     Tobacco Use    Smoking status: Never    Smokeless tobacco: Not on file   Vaping Use    Vaping status: Never Used   Substance and Sexual Activity    Alcohol use: Yes     Alcohol/week: 5.0 standard drinks of alcohol     Types: 5 Glasses of wine per week     Comment: 1 glass of wine with dinner    Drug use: Never    Sexual activity: Not on file     E-Cigarette/Vaping    E-Cigarette Use Never User      E-Cigarette/Vaping Substances     Family history  non-contributory    Objective :  Temp:  [97.7 °F (36.5 °C)-98.3 °F (36.8 °C)] 98.3 °F (36.8 °C)  HR:  [61-81] 81  BP: ()/() 120/76  Resp:  [14-18] 17  SpO2:  [92 %-98 %] 92 %  O2 Device: None (Room air)  Nasal Cannula O2 Flow Rate (L/min):  [2 L/min-5 L/min] 2 L/min  Physical Exam  Vitals and nursing note reviewed.   Constitutional:       Appearance: Normal appearance.   HENT:      Head: Normocephalic and atraumatic.      Nose: Nose normal.      Mouth/Throat:      Mouth: Mucous membranes are moist.      Pharynx: Oropharynx is clear.   Eyes:      Extraocular Movements: Extraocular movements intact.      Pupils: Pupils are equal, round, and reactive to light.   Cardiovascular:      Rate and Rhythm: Normal rate and regular rhythm.      Pulses: Normal pulses.   Pulmonary:      Effort: Pulmonary effort is normal.   Abdominal:      General: Abdomen is flat.      Palpations: Abdomen is soft.   Musculoskeletal:         General: Swelling, tenderness, deformity and signs of injury present.      Cervical back: Normal range of motion and neck supple.      Comments: Musculoskeletal: right lower extremity  Skin dry and intact, no visible open wounds or lacerations, no erythema   Splint clean, dry, intact without strikethrough appreciated  TTP diffusely over ankle  SILT s/s/sp/dp/t.   Range of motion not assessed due to known fracture  Motor intact 5/5 strength with hip flexion/extension, knee flexion/extension, EHL/FHL  2+ DP/PT pulse comparable to contralateral side  Musculature is soft and compressible, no pain with passive stretch  Leg lengths equal    Tertiary: all other noninjured extremities were palpated and ranged looking for secondary injuries. Patient had no pain with range of motion of her other major joints. No tenderness over all other joints/long bones as except already stated.  Bilateral clavicles, shoulders, upper arms, elbows, forearms, wrists, hands, fingers, hips, mid femurs, knees, tibias, left  "ankle, feet, toes all palpated range without significant test to palpation or obvious osseous deformity noted.      Neurological:      Mental Status: She is alert.           Lab Results: I have reviewed the following results:   Recent Labs     02/20/25 2138 02/21/25  0545   WBC 8.25 8.86   HGB 14.8 14.1   HCT 44.5 42.2    153   BUN 12 11   CREATININE 0.75 0.60   INR  --  1.01     Blood Culture: No results found for: \"BLOODCX\"  Wound Culture: No results found for: \"WOUNDCULT\"    Imaging Results Review: I personally reviewed the following image studies/reports in PACS and discussed pertinent findings with Radiology: xray(s). My interpretation of the radiology images/reports is: Right trimalleolar ankle fracture.  Other Study Results Review: No additional pertinent studies reviewed.  "

## 2025-02-21 NOTE — ANESTHESIA PROCEDURE NOTES
Peripheral Block    Patient location during procedure: holding area  Start time: 2/21/2025 10:05 AM  Reason for block: at surgeon's request and post-op pain management  Staffing  Performed by: Alvin Bowden MD  Authorized by: Alvin Bowden MD    Preanesthetic Checklist  Completed: patient identified, IV checked, site marked, risks and benefits discussed, surgical consent, monitors and equipment checked, pre-op evaluation and timeout performed  Peripheral Block  Patient position: supine  Prep: ChloraPrep  Patient monitoring: frequent blood pressure checks, continuous pulse oximetry and heart rate  Block type: Popliteal  Laterality: right  Injection technique: single-shot  Procedures: ultrasound guided, Ultrasound guidance required for the procedure to increase accuracy and safety of medication placement and decrease risk of complications.  Ultrasound permanent image saved  bupivacaine (PF) (MARCAINE) 0.5 % injection 20 mL - Perineural   10 mL - 2/21/2025 10:05:00 AM  bupivacaine liposomal (EXPAREL) 1.3 % injection 20 mL - Perineural   10 mL - 2/21/2025 10:05:00 AM  Needle  Needle type: Stimuplex   Needle gauge: 20 G  Needle length: 4 in  Needle localization: anatomical landmarks and ultrasound guidance  Assessment  Injection assessment: incremental injection, frequent aspiration, injected with ease, negative aspiration, negative for heart rate change, no paresthesia on injection, no symptoms of intraneural/intravenous injection and needle tip visualized at all times  Paresthesia pain: none  Post-procedure:  site cleaned  patient tolerated the procedure well with no immediate complications

## 2025-02-21 NOTE — PLAN OF CARE
Problem: PAIN - ADULT  Goal: Verbalizes/displays adequate comfort level or baseline comfort level  Description: Interventions:  - Encourage patient to monitor pain and request assistance  - Assess pain using appropriate pain scale  - Administer analgesics based on type and severity of pain and evaluate response  - Implement non-pharmacological measures as appropriate and evaluate response  - Consider cultural and social influences on pain and pain management  - Notify physician/advanced practitioner if interventions unsuccessful or patient reports new pain  Outcome: Progressing     Problem: INFECTION - ADULT  Goal: Absence or prevention of progression during hospitalization  Description: INTERVENTIONS:  - Assess and monitor for signs and symptoms of infection  - Monitor lab/diagnostic results  - Monitor all insertion sites, i.e. indwelling lines, tubes, and drains  - Monitor endotracheal if appropriate and nasal secretions for changes in amount and color  - [place] appropriate cooling/warming therapies per order  - Administer medications as ordered  - Instruct and encourage patient and family to use good hand hygiene technique  - Identify and instruct in appropriate isolation precautions for identified infection/condition  Outcome: Progressing     Problem: SAFETY ADULT  Goal: Patient will remain free of falls  Description: INTERVENTIONS:  - Educate patient/family on patient safety including physical limitations  - Instruct patient to call for assistance with activity   - Consult OT/PT to assist with strengthening/mobility   - Keep Call bell within reach  - Keep bed low and locked with side rails adjusted as appropriate  - Keep care items and personal belongings within reach  - Initiate and maintain comfort rounds  - Make Fall Risk Sign visible to staff  - Offer Toileting every 2 Hours, in advance of need  - Initiate/Maintain bed alarm  - Obtain necessary fall risk management equipment  - Apply yellow socks and  bracelet for high fall risk patients  - Consider moving patient to room near nurses station  Outcome: Progressing     Problem: DISCHARGE PLANNING  Goal: Discharge to home or other facility with appropriate resources  Description: INTERVENTIONS:  - Identify barriers to discharge w/patient and caregiver  - Arrange for needed discharge resources and transportation as appropriate  - Identify discharge learning needs (meds, wound care, etc.)  - Arrange for interpretive services to assist at discharge as needed  - Refer to Case Management Department for coordinating discharge planning if the patient needs post-hospital services based on physician/advanced practitioner order or complex needs related to functional status, cognitive ability, or social support system  Outcome: Progressing

## 2025-02-21 NOTE — ED PROCEDURE NOTE
Procedure  Procedural Sedation    Date/Time: 2/21/2025 12:23 AM    Performed by: Stanislaw Hamm MD  Authorized by: Stanislaw Hamm MD    Immediate pre-procedure details:     Reviewed: vital signs and relevant labs/tests      Verified: bag valve mask available, emergency equipment available, intubation equipment available, IV patency confirmed, oxygen available and suction available    Procedure details (see MAR for exact dosages):     Preoxygenation:  Nasal cannula    Sedation:  Propofol    Analgesia:  Fentanyl    Intra-procedure monitoring:  Blood pressure monitoring, continuous capnometry, frequent LOC assessments, cardiac monitor, continuous pulse oximetry and frequent vital sign checks    Intra-procedure events: hypoxia      Intra-procedure management:  Airway repositioning    Total sedation time (minutes):  30  Post-procedure details:     Attendance: Constant attendance by certified staff until patient recovered      Recovery: Patient returned to pre-procedure baseline      Post-sedation assessments completed and reviewed: airway patency, cardiovascular function, mental status, nausea/vomiting, pain level and respiratory function      Patient is stable for discharge or admission: yes      Patient tolerance:  Tolerated well, no immediate complications                   Stanislaw Hamm MD  02/21/25 0027

## 2025-02-21 NOTE — OP NOTE
OPERATIVE REPORT  PATIENT NAME: Dagmar La    :  1960  MRN: [id number]  Pt Location: AN OR ROOM 01    SURGERY DATE: 25    Surgeon(s) and Role:     * Ivan Kingston,  - Primary     * Ade Guzman MD - Assisting     * Roger Samuel PA-C - Observing   I was present for the entire procedure. and I was present for all critical portions of the procedure.    Preop Diagnosis:  #1 right trimalleolar ankle fracture    Post-Op Diagnosis:  #1 right trimalleolar ankle fracture    Procedures:  #1 open reduction internal fixation of right trimalleolar ankle fracture with fixation of the posterior lip      Specimen(s):  None    Estimated Blood Loss:   50 cc    Drains:  None    Anesthesia Type:   General Endotracheal    Operative Indications:  Patient is an 64-year-old female that had a ground-level fall resulting in a right trimalleolar ankle fracture dislocation.  The patient was reduced in the emergency department this was a closed injury.  In order to restore stability to the articular surface decrease risk of posttraumatic arthritis malunions nonunions patient was consented for surgical fixation      Implants:   Oneida 2.7 mm T plate, Oneida one third tubular plate  [place] 4.0 fully threaded cannulated screws    Tourniquet time:   Tourniquet was plated to 250 mmHg for 111 minutes      Complications:   No acute complications were encountered.  Patient was transferred to PACU in stable condition    Operative findings:  Patient had a large posterior malleolus fragment, a short oblique fibular fracture and a highly comminuted medial malleolus fracture with significant disruption of the medial cortex.  The fibula was able to be anatomically reduced and lag with a 2.4 mm lag screw and then was secured with a 3.51 third tubular plate in antiglide technique.  The posterior malleolus had a small area of cortex and impacted into the fracture site.  This was removed to allow for anatomic reduction of the  posterior malleolus.  It was then secured with a 2.7 mm T plate with lag screws into the distal fragment to gain articular compression and then an axillary screw to provide a buttress and antiglide effect.  The medial malleolus was highly comminuted.  The patient had disrupted the tarsal tunnel bony attachment of the sling of the connective tissue of the retinaculum.  This caused bone loss over the medial metaphysis.  To reduce the medial malleolus we needed to dissect over the anterior edge.  The patient had an anterior capsular avulsion as well.  This allowed us to visualize the anterior medial corner of the articular cartilage which was used as a reduction guide with a modified clamp.  Once this was performed there was a gap in the metaphyseal bone and this was filled with cancellous bone from the posterior fracture site.  4.0 mm cannulated screws were inserted to stabilize this fragment and then the retinaculum was tied down to the anterior capsule overlying the metaphyseal defect to stabilize the tarsal tunnel components.  After fixation the ankle was stable.    Procedure and Technique:  Patient is a 64-year-old female seen examined preoperatively.  Operative site is marked all his questions answered.  The patient was then taken back to the operating room general endotracheal anesthesia was administered by paresthesia.  Patient was then transferred the operating table is in CT LS spine precautions.  All bony promises well-padded.  Patient was placed in prone position.  The right lower extremity was placed in a nonsterile tourniquet.  The right lower extremity was then prepped and draped in standard sterile orthopedic fashion.  Timeouts performed confirm correct site, correct patient, correct procedure.  All in agreement procedure started.  The right lower extremity was exsanguinated tourniquet was inflated to 250 mmHg.  An posterior lateral incision was made sharply with #10 blade through skin subtenons  tissues.  Electrocautery used to obtain hemostasis.  The sural nerve was identified and protected medially.  The fascia overlying the peroneal musculature was incised.  The fibula was first dissected out to allow for visualization of the short oblique Christian B fibula fracture.  This was irrigated and removed of entrapped fracture hematoma with a pituitary rongeur and suction and irrigation.  Once this was performed the flexor hallux longus was elevated off of the posterior aspect of the tibia.  The patient had a very large posterior malleolus fragment.  There was no entrapped piece of small articular cartilage that impacted and displaced.  This was used for bone graft.  This allowed us to anatomically reduce the posterior malleolus and K wires were used to hold the reduction in place.  Once this was performed and confirmed a 2.7 mm T plate was contoured to match the patient's anatomy and then secured with an axillary screw and then with two 2.7 mm lag by technique screws through the articular block to gain compression at the articular surface.  The plate was then further secured with an additional 2.7 mm cortical screw proximally.  Attention was then turned to the medial side.  The patient's medial incision was made and the patient had significant comminution of the medial metaphysis.  There was multiple fragments that had been avulsed off from the tarsal tunnel.  The flexor retinaculum was completely torn off and  from the anterior capsule which was also avulsed off of the anterior aspect of the ankle joint.  The ankle fracture site was irrigated and removed the entrapped fracture hematoma.  The patient had some scuffing of the anterior medial talar dome.  The reduction was difficult due to the metaphyseal comminution and loss of cortical reads.  Therefore utilizing the anterior capsular avulsion the articular surface was directly visualized and the fragment was manipulated into place and then held with an  offset clamp.  Once this was confirmed on the radiographic imaging it was held with 2 K wires.  These K wires were then exchanged for a 4.0 mm fully threaded screws.  Once this performed bone from the posterior aspect of the ankle that was removed was used as bone graft for the medial metaphyseal bone loss.  Final reduction implant placement is confirmed under multiplanar fluoroscopic imaging.  The ankle was stable after external rotational views.  The wounds were then both placed to a 1 g vancomycin split between the 2 wounds.  The flexor retinaculum was then repaired to the remaining soft tissue overlying the metaphyseal bone void using 2-0 Monocryl suture.  The subcutaneous tissues repaired with a 2-0 Monocryl suture.  The tourniquet was let down due to venous tourniquet.  Hemostasis was obtained.  The skin was then closed with 2-0 nylon sutures.  The wounds were then dressed in Adaptic 4 x 4's and the patient was placed into a well-padded trilaminar splint.  The patient was then transferred off the operating table using CT LS spine precautions extubated transferred PACU in stable condition        Postoperative plan:  Patient will be strictly nonweightbearing to the right lower extremity.  Patient received 24 hours postoperative antibiotics for infection prophylaxis.  The patient will continue on Lovenox while in hospital and be discharged on aspirin 81 mg twice daily for 6 weeks for DVT prophylaxis.  The patient will need to follow-up in the office in 2 to 3 weeks for repeat x-ray evaluation and removal of her sutures    SIGNATURE: Ivan Kingston DO  DATE: February 21, 2025  TIME: 1:09 PM

## 2025-02-21 NOTE — OCCUPATIONAL THERAPY NOTE
Occupational Therapy Cancellation Note     Patient Name: Dagmar La  Today's Date: 2/21/2025 02/21/25 5916   Note Type   Note type Cancelled Session   Cancel Reasons Patient to operating room   Additional Comments OT consult received and chart reviewed. Pt presents s/p a fall. Found to have closed trimalleolar fx of R ankle. Pt is planned to the OR for ORIF of right ankle. Will hold and f/u post-op. Appreciate updated weight-bearing and activity orders.         Jade Salazar MS OTR/L   [place] Licensure# [id number]

## 2025-02-21 NOTE — ASSESSMENT & PLAN NOTE
- Acute closed trimalleolar fracture of the right ankle, present on admission.  - Status post reduction and splinting by the emergency department staff on 2/20/2025.  - Appreciate Orthopedic surgery evaluation, recommendations and interventions as noted.  - Status post ORIF of the right trimalleolar ankle fracture with fixation of the posterior lip on 2/21/2025.    - Maintain NON-weightbearing status on the right lower extremity in splint postoperatively.  - Monitor right lower extremity neurovascular exam.  - Continue multimodal analgesic regimen.  - Continue DVT prophylaxis.  - Per Orthopedic surgery: Patient should remain on Lovenox during hospital encounter, but may be transition to aspirin 81 mg twice daily for 6 weeks on discharge.  - PT and OT evaluation and treatment as indicated.  - Outpatient follow up with Orthopedic surgery for re-evaluation.

## 2025-02-21 NOTE — ANESTHESIA POSTPROCEDURE EVALUATION
Post-Op Assessment Note    CV Status:  Stable    Pain management: adequate    Multimodal analgesia used between 6 hours prior to anesthesia start to PACU discharge    Hydration Status:  Stable   PONV Controlled:  None   Airway Patency:  Patent     Post Op Vitals Reviewed: Yes    No anethesia notable event occurred.    Staff: Anesthesiologist           Last Filed PACU Vitals:  Vitals Value Taken Time   Temp 98.7 °F (37.1 °C) 02/21/25 1317   Pulse 78 02/21/25 1401   /74 02/21/25 1400   Resp 17 02/21/25 1401   SpO2 94 % 02/21/25 1401   Vitals shown include unfiled device data.    Modified Belen:     Vitals Value Taken Time   Activity 2 02/21/25 1322   Respiration 2 02/21/25 1322   Circulation 2 02/21/25 1322   Consciousness 1 02/21/25 1322   Oxygen Saturation 1 02/21/25 1322     Modified Belen Score: 8

## 2025-02-21 NOTE — ASSESSMENT & PLAN NOTE
- Status post slip and fall on ice with the below noted injuries.  - Fall precautions.  - PT and OT evaluation and treatment as indicated.  - Case Management consultation for disposition planning.

## 2025-02-21 NOTE — ED PROVIDER NOTES
ED Disposition       None          Assessment & Plan       Medical Decision Making  64-year-old female presents with fall and deformity of the right ankle.  Differential includes but not limited to trimalleolar fracture, tibia fracture, fibula fracture, dislocation.    Fentanyl for pain relief.  X-rays of the ankle, tibia, fibula.    Trimalleolar fracture.  Patient continues to be neurovascularly intact.  Consented to procedural sedation and closed reduction of the right ankle.  Appropriate reduction on repeat x-ray.  Continues to be neurovascularly intact.    Discussed case with trauma and will admit for trimalleolar fracture.    Amount and/or Complexity of Data Reviewed  Labs: ordered.  Radiology: ordered and independent interpretation performed.    Risk  Prescription drug management.        ED Course as of 02/21/25 0043   Fri Feb 21, 2025   0021 Discussed w/ trauma       Medications   fentaNYL injection 75 mcg (75 mcg Intravenous Given 2/20/25 2136)   propofol (DIPRIVAN) 200 MG/20ML bolus injection 200 mg (60 mg Intravenous Given 2/21/25 0004)   fentaNYL injection 50 mcg (50 mcg Intravenous Given 2/21/25 0018)       ED Risk Strat Scores                            SBIRT 20yo+      Flowsheet Row Most Recent Value   Initial Alcohol Screen: US AUDIT-C     1. How often do you have a drink containing alcohol? 0 Filed at: 02/20/2025 2117   2. How many drinks containing alcohol do you have on a typical day you are drinking?  0 Filed at: 02/20/2025 2117   3a. Male UNDER 65: How often do you have five or more drinks on one occasion? 0 Filed at: 02/20/2025 2117   3b. FEMALE Any Age, or MALE 65+: How often do you have 4 or more drinks on one occassion? 0 Filed at: 02/20/2025 2117   Audit-C Score 0 Filed at: 02/20/2025 2117   TIGIST: How many times in the past year have you...    Used an illegal drug or used a prescription medication for non-medical reasons? Never Filed at: 02/20/2025 2117                            History  of Present Illness       Chief Complaint   Patient presents with    Ankle Injury     Reports she was walking in her driveway and slipped on ice. Denies head strike. Complaining of R lower extremity pain, swelling, and deformity.       History reviewed. No pertinent past medical history.   History reviewed. No pertinent surgical history.   Family History   Problem Relation Age of Onset    No Known Problems Mother     No Known Problems Father       Social History     Tobacco Use    Smoking status: Never   Vaping Use    Vaping status: Never Used      E-Cigarette/Vaping    E-Cigarette Use Never User       E-Cigarette/Vaping Substances      I have reviewed and agree with the history as documented.     64-year-old female with no significant past medical history presents with fall.  Patient states that she slipped on the ice falling on her right lower extremity.  Noted deformity.  Unable to ambulate after event.  No other injuries.  No head strike.  No LOC.  No blood thinner use.  No deformity at the right ankle.  Slight tingling sensation in the foot.  Denies numbness, tingling, discoloration, open wounds, other injuries.      Ankle Injury      Review of Systems   All other systems reviewed and are negative.          Objective       ED Triage Vitals   Temperature Pulse Blood Pressure Respirations SpO2 Patient Position - Orthostatic VS   02/20/25 2117 02/20/25 2117 02/20/25 2117 02/20/25 2117 02/20/25 2117 02/20/25 2117   97.7 °F (36.5 °C) 70 149/70 16 95 % Lying      Temp src Heart Rate Source BP Location FiO2 (%) Pain Score    -- 02/20/25 2200 02/20/25 2117 -- 02/20/25 2136     Monitor Right arm  5      Vitals      Date and Time Temp Pulse SpO2 Resp BP Pain Score FACES Pain Rating User   02/21/25 0040 -- -- -- -- -- 3 --    02/21/25 0037 -- -- -- -- -- 3 --    02/21/25 0030 -- 64 92 % 16 136/108 -- --    02/21/25 0028 -- 61 -- 16 -- -- --    02/21/25 0025 -- 62 96 % 16 109/66 -- --    02/21/25 0024 -- 63 -- --  -- -- -- CH   02/21/25 0021 -- 69 96 % 14 101/64 -- -- CH   02/21/25 0018 -- 65 97 % -- -- 8 --    02/21/25 0016 -- 63 98 % 18 98/60 -- -- CH   02/21/25 0015 -- 66 98 % -- -- -- -- CH   02/21/25 0012 -- 61 97 % -- -- -- --    02/21/25 0011 -- 61 97 % 18 128/63 -- -- CH   02/21/25 0009 -- 67 97 % -- -- -- -- CH   02/21/25 0008 -- 79 98 % 18 116/60 -- --    02/21/25 0006 -- 69 97 % 14 115/56 -- --    02/21/25 0003 -- 71 97 % -- -- -- --    02/21/25 0000 -- 73 96 % -- 152/55 -- --    02/20/25 2358 -- 80 96 % 16 142/64 -- -- CH   02/20/25 2330 -- 66 97 % 16 140/67 -- --    02/20/25 2300 -- 78 94 % 18 160/69 -- --    02/20/25 2204 -- -- -- -- -- 2 --    02/20/25 2200 -- 65 95 % 16 183/73 -- --    02/20/25 2136 -- -- -- -- -- 5 --    02/20/25 2130 -- 71 95 % 16 144/66 -- --    02/20/25 2117 97.7 °F (36.5 °C) 70 95 % 16 149/70 -- -- CH            Physical Exam  Vitals and nursing note reviewed.   Constitutional:       General: She is not in acute distress.     Appearance: Normal appearance. She is normal weight. She is not ill-appearing, toxic-appearing or diaphoretic.   HENT:      Head: Normocephalic and atraumatic.      Right Ear: External ear normal.      Left Ear: External ear normal.      Nose: Nose normal.      Mouth/Throat:      Mouth: Mucous membranes are moist.      Pharynx: Oropharynx is clear.   Eyes:      General: No scleral icterus.        Right eye: No discharge.         Left eye: No discharge.      Extraocular Movements: Extraocular movements intact.   Cardiovascular:      Rate and Rhythm: Normal rate and regular rhythm.      Pulses: Normal pulses.   Pulmonary:      Effort: Pulmonary effort is normal. No respiratory distress.   Musculoskeletal:         General: Tenderness and deformity present. No swelling or signs of injury.      Cervical back: Neck supple.      Right lower leg: No edema.      Left lower leg: No edema.      Comments: Deformity at the right ankle.  DP/PT pulses 2+  bilaterally.  Sensation intact over distal extremities.  No open wounds.  Compartments are soft and nontender.  No pain at proximal fibula.   Skin:     General: Skin is warm and dry.      Capillary Refill: Capillary refill takes less than 2 seconds.      Coloration: Skin is not cyanotic, jaundiced or pale.      Findings: No bruising, erythema, lesion, petechiae or rash.   Neurological:      General: No focal deficit present.      Mental Status: She is alert and oriented to person, place, and time. Mental status is at baseline.         Results Reviewed       Procedure Component Value Units Date/Time    Basic metabolic panel [[id number]] Collected: 02/20/25 2138    Lab Status: Final result Specimen: Blood from Arm, Right Updated: 02/20/25 2206     Sodium 137 mmol/L      Potassium 4.5 mmol/L      Chloride 101 mmol/L      CO2 23 mmol/L      ANION GAP 13 mmol/L      BUN 12 mg/dL      Creatinine 0.75 mg/dL      Glucose 98 mg/dL      Calcium 9.4 mg/dL      eGFR 83 ml/min/1.73sq m     Narrative:      National Kid[employer] guidelines for Chronic Kidney Disease (CKD):     Stage 1 with normal or high GFR (GFR > 90 mL/min/1.73 square meters)    Stage 2 Mild CKD (GFR = 60-89 mL/min/1.73 square meters)    Stage 3A Moderate CKD (GFR = 45-59 mL/min/1.73 square meters)    Stage 3B Moderate CKD (GFR = 30-44 mL/min/1.73 square meters)    Stage 4 Severe CKD (GFR = 15-29 mL/min/1.73 square meters)    Stage 5 End Stage CKD (GFR <15 mL/min/1.73 square meters)  Note: GFR calculation is accurate only with a steady state creatinine    CBC and differential [[id number]]  (Abnormal) Collected: 02/20/25 2138    Lab Status: Final result Specimen: Blood from Arm, Right Updated: 02/20/25 2156     WBC 8.25 Thousand/uL      RBC 4.51 Million/uL      Hemoglobin 14.8 g/dL      Hematocrit 44.5 %      MCV 99 fL      MCH 32.8 pg      MCHC 33.3 g/dL      RDW 13.5 %      MPV 10.3 fL      Platelets 162 Thousands/uL      nRBC 0 /100 WBCs       Segmented % 51 %      Immature Grans % 0 %      Lymphocytes % 34 %      Monocytes % 12 %      Eosinophils Relative 2 %      Basophils Relative 1 %      Absolute Neutrophils 4.24 Thousands/µL      Absolute Immature Grans 0.02 Thousand/uL      Absolute Lymphocytes 2.84 Thousands/µL      Absolute Monocytes 0.97 Thousand/µL      Eosinophils Absolute 0.14 Thousand/µL      Basophils Absolute 0.04 Thousands/µL             XR ankle 3+ vw right   ED Interpretation by Mary Kay Erazo MD (02/20 2242)   Trimalleolar fracture.      XR tibia fibula 2 vw right   ED Interpretation by Mary Kay Erazo MD (02/20 2242)   No acute bony abnormalities.      XR ankle 3+ views RIGHT    (Results Pending)       Orthopedic injury treatment    Date/Time: 2/20/2025 11:56 PM    Performed by: Mary Kay Erazo MD  Authorized by: Mary Kay Erazo MD    Patient Location:  ED  [place] Protocol:  Procedure performed by: (Dr. Hairston)  Consent: Verbal consent obtained. Written consent obtained.  Risks and benefits: risks, benefits and alternatives were discussed  Consent given by: patient  Timeout called at: 2/20/2025 11:56 PM.  Patient understanding: patient states understanding of the procedure being performed  Patient consent: the patient's understanding of the procedure matches consent given  Procedure consent: procedure consent matches procedure scheduled  Relevant documents: relevant documents present and verified  Test results: test results available and properly labeled  Site marked: the operative site was marked  Radiology Images displayed and confirmed. If images not available, report reviewed: imaging studies available  Required items: required blood products, implants, devices, and special equipment available  Patient identity confirmed: verbally with patient and arm band    Injury location:  Ankle  Location details:  Right ankle  Injury type:  Fracture  Fracture type: trimalleolar    Neurovascular status: Neurovascularly intact     Distal perfusion: normal    Neurological function: normal    Range of motion: reduced    Local anesthesia used?: No    General anesthesia used?: No    Sedation type:  Moderate (conscious) sedation (See separate Procedural Sedation form)  Manipulation performed?: Yes    Skin traction used?: Yes    Skeletal traction used?: No    Reduction successful?: Yes    Confirmation: Reduction confirmed by x-ray    Immobilization:  Splint  Splint type: Posterior leg with sugar-tong.  Supplies used:  Ortho-Glass  Neurovascular status: Neurovascularly intact    Distal perfusion: normal    Neurological function: normal    Range of motion: normal    Patient tolerance:  Patient tolerated the procedure well with no immediate complications      ED Medication and Procedure Management   None     Patient's Medications    No medications on file     No discharge procedures on file.  ED SEPSIS DOCUMENTATION            Mary Kay Erazo MD  02/21/25 0043

## 2025-02-21 NOTE — PROGRESS NOTES
Progress Note - Trauma   Name: Dagmar La 64 y.o. female I MRN: [id number]  Unit/Bed#: OR POOL I Date of Admission: 2/20/2025   Date of Service: 2/21/2025 I Hospital Day: 0    Assessment & Plan  Fall  - Status post slip and fall on ice with the below noted injuries.  - Fall precautions.  - PT and OT evaluation and treatment as indicated.  - Case Management consultation for disposition planning.  Closed trimalleolar fracture of right ankle with routine healing  - Acute closed trimalleolar fracture of the right ankle, present on admission.  - Status post reduction and splinting by the emergency department staff on 2/20/2025.  - Appreciate Orthopedic surgery evaluation, recommendations and interventions as noted.   - Plan for OR with orthopedic surgery today.  - From a trauma standpoint, patient is medically appropriate to proceed with operative intervention without further preoperative workup required.  - Maintain NON-weightbearing status on the right lower extremity in splint.  - Monitor right lower extremity neurovascular exam.  - Continue multimodal analgesic regimen.  - Continue DVT prophylaxis.  - PT and OT evaluation and treatment as indicated.  - Outpatient follow up with Orthopedic surgery for re-evaluation.  Acute pain due to trauma  - Acute pain secondary to traumatic injuries.  - Continue multimodal analgesic regimen.  - Bowel regimen as long as using opioids.  - Continue to monitor pain and adjust regimen as indicated.    VTE Prophylaxis: VTE covered by:  [Transfer Hold] enoxaparin, Subcutaneous, 30 mg at 02/21/25 0535        Disposition: Continue current level of care with patient proceeding to the OR today.  Await therapy evaluation and recommendations postoperatively.  Case management consulted for disposition planning.  Please contact the SecureChat role for the Trauma service with any questions/concerns.    TRAUMA TERTIARY SURVEY  Summary of Diagnosed Injuries: See above.    Transfer from:  "N/A    Mechanism of Injury:Fall     Chief Complaint: \"I'm okay.\"    24 Hour Events : Admitted with right ankle fracture.  No other significant events following admission.  Subjective : Patient is doing okay this morning.  She does report some right ankle pain overnight and this morning that has been controlled with her current medication regimen.  Aside from the right ankle pain, she offers no other complaints and denies pain elsewhere.    Objective :  Temp:  [97.7 °F (36.5 °C)-98.3 °F (36.8 °C)] 97.7 °F (36.5 °C)  HR:  [61-94] 94  BP: ()/() 170/85  Resp:  [14-18] 18  SpO2:  [92 %-98 %] 93 %  O2 Device: None (Room air)  Nasal Cannula O2 Flow Rate (L/min):  [2 L/min-5 L/min] 2 L/min    I/O         02/19 0701 02/20 0700 02/20 0701 02/21 0700 02/21 0701  02/22 0700    Urine (mL/kg/hr)  200     Total Output  200     Net  -200                    Physical Exam   GENERAL APPEARANCE: Patient in no acute distress.  HEENT: NCAT; EOMs intact; Mucous membranes moist  NECK / BACK: No midline cervical, thoracic or lumbar spine tenderness, step-offs or deformities.  No paraspinal muscular tenderness in the neck or back.  CV: Regular rate and rhythm; no murmur/gallops/rubs appreciated.  CHEST / LUNGS: Clear to auscultation; no wheezes/rales/rhonci.  Breathing comfortably on room air.  No chest wall tenderness, crepitus or deformities.  ABD: NABS; soft; non-distended; non-tender.  : Voiding spontaneously.  EXT: +2 pulses bilaterally upper & lower extremities. Normal range of motion in the bilateral upper and left lower extremities without pain, tenderness or deformity.  Right lower extremity is splinted below the knee with mild swelling to the distal exposed foot, but intact neurovascular exam and motor function noted with minimal right ankle tenderness.  The remainder the right lower extremity exam was unremarkable.  NEURO: GCS 15; no focal neurologic deficits; neurovascularly intact.  SKIN: Warm, dry and well " perfused; no rash; no jaundice.    1. Before the illness or injury that brought you to the Emergency, did you need someone to help you on a regular basis? 0=No   2. Since the illness or injury that brought you to the Emergency, have you needed more help than usual to take care of yourself? 1=Yes   3. Have you been hospitalized for one or more nights during the past 6 months (excluding a stay in the Emergency Department)? 0=No   4. In general, do you see well? 0=Yes   5. In general, do you have serious problems with your memory? 0=No   6. Do you take more than three different medications everyday? 0=No   TOTAL   1     Did you order a geriatric consult if the score was 2 or greater?: n/a           Lab Results: I have reviewed the following results:  Recent Labs     02/21/25  0545   WBC 8.86   HGB 14.1   HCT 42.2      SODIUM 138   K 4.1      CO2 22   BUN 11   CREATININE 0.60   GLUC 105   INR 1.01       Imaging Results Review: I reviewed radiology reports from this admission including: xray(s).  Other Study Results Review: No additional pertinent studies reviewed.

## 2025-02-21 NOTE — H&P
H&P - Trauma   Name: Dagmar La 64 y.o. female I MRN: [id number]  Unit/Bed#: ED-37 I Date of Admission: 2/20/2025   Date of Service: 2/21/2025 I Hospital Day: 0     Assessment & Plan  Closed trimalleolar fracture of right ankle, initial encounter  S/p reduction and splinting in the ED  NVI  Ortho consulted, appreciate recommendations  NWB to RLE  Q1h neurovasc checks  Multimodal pain control  NPO     Trauma Alert: Evaluation; trauma team notified at 0034 via text   Model of Arrival: Self    Trauma Team: Attending David and Residents DAT Webb  Consultants:     Orthopedics: routine consult; Epic consult order placed and consultant notified (via phone/text @ time 0056);     History of Present Illness   Chief Complaint: right ankle pain  Mechanism:Fall     Dagmar La is a 64 y.o. female who presents to the ED for evaluation of right ankle injury. Said she was bending down to pet her cat outside when she slipped on snow/ice on the steps and injured her ankle. Seen by ED doctor and found to have right trimalleolar fracture on XR imaging. Reduced and splinted in the ED. She remains NVI and pain currently controlled. She is otherwise a healthy lady and takes preventative daily baby aspirin and some vitamins. Last meal was 6pm.     Review of Systems  Historical Information   History reviewed. No pertinent past medical history.  History reviewed. No pertinent surgical history.  Social History     Tobacco Use    Smoking status: Never    Smokeless tobacco: Not on file   Vaping Use    Vaping status: Never Used   Substance and Sexual Activity    Alcohol use: Not on file    Drug use: Not on file    Sexual activity: Not on file     E-Cigarette/Vaping    E-Cigarette Use Never User      E-Cigarette/Vaping Substances     Family history non-contributory    There is no immunization history on file for this patient.  Last Tetanus: unknown    1. Before the illness or injury that brought you to the Emergency, did you need someone to  help you on a regular basis? 0=No   2. Since the illness or injury that brought you to the Emergency, have you needed more help than usual to take care of yourself? 1=Yes   3. Have you been hospitalized for one or more nights during the past 6 months (excluding a stay in the Emergency Department)? 0=No   4. In general, do you see well? 0=Yes   5. In general, do you have serious problems with your memory? 0=No   6. Do you take more than three different medications everyday? 0=No   TOTAL   1     Did you order a geriatric consult if the score was 2 or greater?: no       Objective :  Temp:  [97.7 °F (36.5 °C)] 97.7 °F (36.5 °C)  HR:  [61-80] 70  BP: ()/() 122/62  Resp:  [14-18] 16  SpO2:  [92 %-98 %] 94 %  O2 Device: None (Room air)  Nasal Cannula O2 Flow Rate (L/min):  [2 L/min-5 L/min] 2 L/min    Initial Vitals:   Temperature: 97.7 °F (36.5 °C) (02/20/25 2117)  Pulse: 70 (02/20/25 2117)  Respirations: 16 (02/20/25 2117)  Blood Pressure: 149/70 (02/20/25 2117)    Primary Survey:   Airway:                         Breathing: Hospital Interventions: Patient arrived via self vehicle                     Right breath sounds:        Left breath sounds:   Circulation: Hospital Interventions: Patient arrived via self vehicle       Rhythm:           Right Pulses Left Pulses                        Disability:        GCS: Eye: 4; Verbal: 5 Motor: 6 Total: 15       Right Pupil: round;  reactive         Left Pupil:  round;  reactive      R Motor Strength L Motor Strength    R : 5/5   L : 5/5  L dorsiflex: 5/5  L plantarflex: 5/5        Sensory:  No sensory deficit  Exposure:           Secondary Survey:  Physical Exam  Constitutional:       General: She is not in acute distress.     Appearance: Normal appearance. She is not ill-appearing, toxic-appearing or diaphoretic.   HENT:      Head: Normocephalic and atraumatic.   Eyes:      Extraocular Movements: Extraocular movements intact.      Conjunctiva/sclera:  Conjunctivae normal.   Cardiovascular:      Pulses: Normal pulses.      Heart sounds: Normal heart sounds.   Pulmonary:      Effort: Pulmonary effort is normal.      Breath sounds: Normal breath sounds.   Abdominal:      General: There is no distension.      Palpations: Abdomen is soft.      Tenderness: There is no abdominal tenderness. There is no guarding or rebound.   Musculoskeletal:         General: Signs of injury (right ankle in splint. NVI. Motor and sensation intact to the toes. Skin warm and pink. Compartments of the thigh and lower leg soft.) present.      Cervical back: Normal range of motion and neck supple.      Right lower leg: No edema.      Left lower leg: No edema.   Skin:     General: Skin is warm and dry.   Neurological:      General: No focal deficit present.      Mental Status: She is alert and oriented to person, place, and time.             Lab Results: I have reviewed the following results:  Recent Labs     02/20/25  2138   WBC 8.25   HGB 14.8   HCT 44.5      SODIUM 137   K 4.5      CO2 23   BUN 12   CREATININE 0.75   GLUC 98       Imaging Results: I have personally reviewed pertinent images saved in PACS. CT scan findings (and other pertinent positive findings on images) were discussed with radiology. My interpretation of the images/reports are as follows:  Chest Xray(s): N/A   FAST exam(s): N/A   CT Scan(s): N/A   Additional Xray(s): positive for acute findings: right trimalleolar fracture     Other Studies: Other Study Results Review: No additional pertinent studies reviewed.

## 2025-02-21 NOTE — ED ATTENDING ATTESTATION
2/20/2025  I, Lewis Hairston MD, saw and evaluated the patient. I have discussed the patient with the resident/non-physician practitioner and agree with the resident's/non-physician practitioner's findings, Plan of Care, and MDM as documented in the resident's/non-physician practitioner's note, except where noted. All available labs and Radiology studies were reviewed.  I was present for key portions of any procedure(s) performed by the resident/non-physician practitioner and I was immediately available to provide assistance.       At this point I agree with the current assessment done in the Emergency Department.  I have conducted an independent evaluation of this patient a history and physical is as follows:    64-year-old female presented for evaluation after she slipped on ice in her driveway sustaining a right ankle injury.  Notable deformity, swelling, ecchymosis on exam.  DP pulse remains intact as well as normal capillary refill, sensation and movement of the toes.  Pain elsewhere.  No head strike, neck pain, chest or abdominal pain.      ED Course  ED Course as of 02/21/25 0021   Thu Feb 20, 2025 2229 X-ray notable for displaced trimalleolar fracture.     Patient was sedated with propofol for closed reduction of trimalleolar fracture of the right ankle without any immediate complications.  Was placed in a posterior/stirrup splint with fiberglass.  Admitted to trauma service for pain control, orthopedic consultation for definitive management        Critical Care Time  Procedures

## 2025-02-21 NOTE — DISCHARGE INSTR - AVS FIRST PAGE
Discharge Instructions - Orthopedics  Dagmar La 64 y.o. female MRN: [id number]  Unit/Bed#: AN OR MAIN    Weight Bearing Status:                                           Patient to be nonweightbearing to right lower extremity in splint    DVT prophylaxis:  Patient to be discharged on aspirin 81 Mg twice daily for 6 weeks    Pain:  Continue analgesics as directed    Dressing Instructions:   -Please keep clean, dry and intact until follow up   -DO NOT REMOVE DRESSINGS OVER SURGICAL WOUNDS  -DO NOT SHOWER UNTIL AUTHORIZED BY OPERATING PHYSICIAN   -DO NOT REMOVE STAPLES/SUTURES UNLESS AUTHORIZED BY OPERATING PHYSICIAN  -If dressing become saturated/wet contact the treating orthopedic surgeon prior to removing dressings    Appt Instructions:   If you do not have your appointment, please call the clinic at [phone] with Dr. Kingston  Otherwise follow up as scheduled.    Contact the office sooner if you experience any increased numbness/tingling in the extremities.      Miscellaneous:   Follow-up in 2 weeks for repeat x-ray and reevaluation

## 2025-02-21 NOTE — ANESTHESIA PROCEDURE NOTES
Peripheral Block    Patient location during procedure: holding area  Start time: 2/21/2025 10:00 AM  Reason for block: at surgeon's request and post-op pain management  Staffing  Performed by: Alvin Bowden MD  Authorized by: Alvin Bowden MD    Preanesthetic Checklist  Completed: patient identified, IV checked, site marked, risks and benefits discussed, surgical consent, monitors and equipment checked, pre-op evaluation and timeout performed  Peripheral Block  Patient position: supine  Prep: ChloraPrep  Patient monitoring: frequent blood pressure checks, continuous pulse oximetry and heart rate  Block type: Adductor Canal  Laterality: right  Injection technique: single-shot  Procedures: ultrasound guided, Ultrasound guidance required for the procedure to increase accuracy and safety of medication placement and decrease risk of complications.  Ultrasound permanent image saved  bupivacaine liposomal (EXPAREL) 1.3 % injection 20 mL - Perineural   10 mL - 2/21/2025 10:00:00 AM  bupivacaine (PF) (MARCAINE) 0.25 % injection 20 mL - Perineural   10 mL - 2/21/2025 10:00:00 AM  Needle  Needle type: Stimuplex   Needle gauge: 20 G  Needle length: 4 in  Needle localization: anatomical landmarks and ultrasound guidance  Assessment  Injection assessment: incremental injection, frequent aspiration, injected with ease, negative aspiration, negative for heart rate change, no paresthesia on injection, no symptoms of intraneural/intravenous injection and needle tip visualized at all times  Paresthesia pain: none  Post-procedure:  site cleaned  patient tolerated the procedure well with no immediate complications

## 2025-02-21 NOTE — ASSESSMENT & PLAN NOTE
NWB to right lower extremity in splint  To OR today for ORIF right ankle with Dr. Kingston  NPO status confirmed   Preoperative antibiotics ordered  Intraoperative TXA ordered  Cleared for trauma for or today  All labs including CBC, BMP, type and screen, INR all reviewed preoperatively.  Chest x-ray, EKG all reviewed.  Consented obtained and on file in OR  Will monitor for ABLA and administer IVF/prbc as indicated for Greater than 2 gram drop or Hgb < 7.  Patient hemoglobin currently at 14.1  PT/OT as tolerated  Incentive spirometry  Pain control per primary team  DVT ppx  : Recommend aspirin 81 Mg twice daily for 6 weeks upon discharge  All other medical comorbidities to be managed per primary team  Dispo: Ortho will follow  See above for additional details   Case reviewed and discussed with Dr. Kingston

## 2025-02-22 VITALS
DIASTOLIC BLOOD PRESSURE: 97 MMHG | HEIGHT: 57 IN | BODY MASS INDEX: 38.4 KG/M2 | WEIGHT: 178 LBS | HEART RATE: 82 BPM | RESPIRATION RATE: 18 BRPM | SYSTOLIC BLOOD PRESSURE: 138 MMHG | TEMPERATURE: 98.1 F | OXYGEN SATURATION: 97 %

## 2025-02-22 LAB
ANION GAP SERPL CALCULATED.3IONS-SCNC: 7 MMOL/L (ref 4–13)
BUN SERPL-MCNC: 11 MG/DL (ref 5–25)
CALCIUM SERPL-MCNC: 8.7 MG/DL (ref 8.4–10.2)
CHLORIDE SERPL-SCNC: 104 MMOL/L (ref 96–108)
CO2 SERPL-SCNC: 26 MMOL/L (ref 21–32)
CREAT SERPL-MCNC: 0.58 MG/DL (ref 0.6–1.3)
DME PARACHUTE DELIVERY DATE REQUESTED: NORMAL
DME PARACHUTE ITEM DESCRIPTION: NORMAL
DME PARACHUTE ORDER STATUS: NORMAL
DME PARACHUTE SUPPLIER NAME: NORMAL
DME PARACHUTE SUPPLIER PHONE: NORMAL
ERYTHROCYTE [DISTWIDTH] IN BLOOD BY AUTOMATED COUNT: 13.6 % (ref 11.6–15.1)
GFR SERPL CREATININE-BSD FRML MDRD: 97 ML/MIN/1.73SQ M
GLUCOSE SERPL-MCNC: 116 MG/DL (ref 65–140)
HCT VFR BLD AUTO: 36.6 % (ref 34.8–46.1)
HGB BLD-MCNC: 12.6 G/DL (ref 11.5–15.4)
MCH RBC QN AUTO: 33.5 PG (ref 26.8–34.3)
MCHC RBC AUTO-ENTMCNC: 34.4 G/DL (ref 31.4–37.4)
MCV RBC AUTO: 97 FL (ref 82–98)
PLATELET # BLD AUTO: 147 THOUSANDS/UL (ref 149–390)
PMV BLD AUTO: 10.5 FL (ref 8.9–12.7)
POTASSIUM SERPL-SCNC: 3.9 MMOL/L (ref 3.5–5.3)
RBC # BLD AUTO: 3.76 MILLION/UL (ref 3.81–5.12)
SODIUM SERPL-SCNC: 137 MMOL/L (ref 135–147)
WBC # BLD AUTO: 10.85 THOUSAND/UL (ref 4.31–10.16)

## 2025-02-22 PROCEDURE — 99238 HOSP IP/OBS DSCHRG MGMT 30/<: CPT | Performed by: PHYSICIAN ASSISTANT

## 2025-02-22 PROCEDURE — 85027 COMPLETE CBC AUTOMATED: CPT

## 2025-02-22 PROCEDURE — 97163 PT EVAL HIGH COMPLEX 45 MIN: CPT

## 2025-02-22 PROCEDURE — NC001 PR NO CHARGE: Performed by: PHYSICIAN ASSISTANT

## 2025-02-22 PROCEDURE — 99024 POSTOP FOLLOW-UP VISIT: CPT | Performed by: PHYSICIAN ASSISTANT

## 2025-02-22 PROCEDURE — 80048 BASIC METABOLIC PNL TOTAL CA: CPT

## 2025-02-22 PROCEDURE — 99222 1ST HOSP IP/OBS MODERATE 55: CPT | Performed by: ANESTHESIOLOGY

## 2025-02-22 PROCEDURE — 97167 OT EVAL HIGH COMPLEX 60 MIN: CPT

## 2025-02-22 RX ORDER — ASPIRIN 81 MG/1
81 TABLET ORAL 2 TIMES DAILY
Qty: 56 TABLET | Refills: 0 | Status: SHIPPED | OUTPATIENT
Start: 2025-02-22 | End: 2025-03-22

## 2025-02-22 RX ORDER — SENNOSIDES 8.6 MG
17.2 TABLET ORAL DAILY
Qty: 10 TABLET | Refills: 0 | Status: SHIPPED | OUTPATIENT
Start: 2025-02-23 | End: 2025-03-03 | Stop reason: ALTCHOICE

## 2025-02-22 RX ORDER — ACETAMINOPHEN 325 MG/1
650 TABLET ORAL EVERY 4 HOURS PRN
Start: 2025-02-22

## 2025-02-22 RX ORDER — OXYCODONE HYDROCHLORIDE 5 MG/1
2.5-5 TABLET ORAL EVERY 4 HOURS PRN
Qty: 18 TABLET | Refills: 0 | Status: SHIPPED | OUTPATIENT
Start: 2025-02-22 | End: 2025-02-25

## 2025-02-22 RX ORDER — DOCUSATE SODIUM 100 MG/1
100 CAPSULE, LIQUID FILLED ORAL 2 TIMES DAILY
Qty: 10 CAPSULE | Refills: 0 | Status: SHIPPED | OUTPATIENT
Start: 2025-02-22 | End: 2025-03-03 | Stop reason: ALTCHOICE

## 2025-02-22 RX ORDER — POLYETHYLENE GLYCOL 3350 17 G/17G
17 POWDER, FOR SOLUTION ORAL DAILY
Qty: 85 G | Refills: 0 | Status: SHIPPED | OUTPATIENT
Start: 2025-02-23 | End: 2025-03-03 | Stop reason: ALTCHOICE

## 2025-02-22 RX ADMIN — ACETAMINOPHEN 975 MG: 325 TABLET, FILM COATED ORAL at 05:14

## 2025-02-22 RX ADMIN — ENOXAPARIN SODIUM 30 MG: 30 INJECTION SUBCUTANEOUS at 05:14

## 2025-02-22 RX ADMIN — CEFAZOLIN SODIUM 2000 MG: 2 SOLUTION INTRAVENOUS at 05:14

## 2025-02-22 RX ADMIN — Medication 2.5 MG: at 08:01

## 2025-02-22 RX ADMIN — ACETAMINOPHEN 975 MG: 325 TABLET, FILM COATED ORAL at 12:39

## 2025-02-22 RX ADMIN — Medication 2.5 MG: at 12:39

## 2025-02-22 NOTE — ASSESSMENT & PLAN NOTE
NWB to right lower extremity in splint  Will monitor for ABLA and administer IVF/prbc as indicated for Greater than 2 gram drop or Hgb < 7.  Patient hemoglobin currently at 14.1  PT/OT as tolerated  Incentive spirometry  Pain control per primary team  DVT ppx  : Recommend aspirin 81 Mg twice daily for 6 weeks upon discharge  All other medical comorbidities to be managed per primary team  Dispo: Ortho will follow  See above for additional details   Orthopedically stable for discharge patient may follow-up in outpatient with Dr. Kingston

## 2025-02-22 NOTE — PROGRESS NOTES
Progress Note - Trauma   Name: Dagmar La 64 y.o. female I MRN: [id number]  Unit/Bed#: S -01 I Date of Admission: 2/20/2025   Date of Service: 2/22/2025 I Hospital Day: 1    Assessment & Plan  Fall  - Status post slip and fall on ice with the below noted injuries.  - Fall precautions.  - PT and OT evaluation and treatment as indicated.  - Case Management consultation for disposition planning.  Closed trimalleolar fracture of right ankle with routine healing  - Acute closed trimalleolar fracture of the right ankle, present on admission.  - Status post reduction and splinting by the emergency department staff on 2/20/2025.  - Appreciate Orthopedic surgery evaluation, recommendations and interventions as noted.  - Status post ORIF of the right trimalleolar ankle fracture with fixation of the posterior lip on 2/21/2025.    - Maintain NON-weightbearing status on the right lower extremity in splint postoperatively.  - Monitor right lower extremity neurovascular exam.  - Continue multimodal analgesic regimen.  - Continue DVT prophylaxis.  - Per Orthopedic surgery: Patient should remain on Lovenox during hospital encounter, but may be transition to aspirin 81 mg twice daily for 6 weeks on discharge.  - PT and OT evaluation and treatment as indicated.  - Outpatient follow up with Orthopedic surgery for re-evaluation.  Acute pain due to trauma  - Acute pain secondary to traumatic injuries.  - Continue multimodal analgesic regimen.  - Bowel regimen as long as using opioids.  - Continue to monitor pain and adjust regimen as indicated.    Bowel Regimen: On Colace, MiraLAX and senna.  VTE Prophylaxis:VTE covered by:  enoxaparin, Subcutaneous, 30 mg at 02/22/25 0514        Disposition: Continue current level of care.  Await therapy evaluation and recommendations.  Case management following for disposition planning.  Please contact the SecureChat role for the Trauma service with any questions/concerns.    24 Hour Events : Now  status post repair of right ankle on 2/21/2025.  Subjective : Patient continues to do well this morning postoperatively.  She notes that she did develop a little bit of discomfort in her ankle overnight, but her current medication regimen is working well.  She started to get some sensation back in her toes.  She is tolerating oral intake without nausea or vomiting.  She offers no other complaints at this time.    Objective :  Temp:  [97.3 °F (36.3 °C)-99 °F (37.2 °C)] 98.6 °F (37 °C)  HR:  [] 77  BP: (122-173)/(67-92) 173/92  Resp:  [13-20] 17  SpO2:  [91 %-96 %] 94 %  O2 Device: Nasal cannula  Nasal Cannula O2 Flow Rate (L/min):  [2 L/min-6 L/min] 2 L/min    I/O         02/20 0701  02/21 0700 02/21 0701  02/22 0700 02/22 0701  02/23 0700    I.V. (mL/kg)  1300 (16.1)     Total Intake(mL/kg)  1300 (16.1)     Urine (mL/kg/hr) 200 700 (0.4)     Total Output 200 700     Net -200 +600                    Physical Exam   GENERAL APPEARANCE: Patient in no acute distress.  HEENT: NCAT; EOMs intact; Mucous membranes moist  CV: Regular rate and rhythm; no murmur/gallops/rubs appreciated.  CHEST / LUNGS: Clear to auscultation; no wheezes/rales/rhonci.  Breathing comfortably on room air.  ABD: NABS; soft; non-distended; non-tender.  : Voiding spontaneously.  EXT: +2 pulses bilaterally upper & lower extremities.  Stable swelling to the exposed distal right foot and toes with intact motor function and improving sensation in the toes with normal capillary refill.  The distal right lower extremity remains in a splint and postoperative dressing which is clean and intact.  NEURO: GCS 15; no focal neurologic deficits; neurovascularly intact.  SKIN: Warm, dry and well perfused; no rash; no jaundice.         Lab Results: I have reviewed the following results:  Recent Labs     02/21/25  0545 02/22/25  0505   WBC 8.86 10.85*   HGB 14.1 12.6   HCT 42.2 36.6    147*   SODIUM 138 137   K 4.1 3.9    104   CO2 22 26   BUN  11 11   CREATININE 0.60 0.58*   GLUC 105 116   INR 1.01  --        Imaging Results Review: I reviewed radiology reports from this admission including: chest xray and xray(s).  Other Study Results Review: No additional pertinent studies reviewed.

## 2025-02-22 NOTE — OCCUPATIONAL THERAPY NOTE
Occupational Therapy Evaluation     Patient Name: Dagmar La  Today's Date: 2/22/2025  Problem List  Active Problems:    Closed trimalleolar fracture of right ankle with routine healing    Fall    Acute pain due to trauma    Past Medical History  History reviewed. No pertinent past medical history.  Past Surgical History  History reviewed. No pertinent surgical history.        02/22/25 1130   OT Last Visit   OT Visit Date 02/22/25   Note Type   Note type Evaluation   Pain Assessment   Pain Assessment Tool 0-10   Pain Score No Pain   Restrictions/Precautions   Weight Bearing Precautions Per Order Yes   RLE Weight Bearing Per Order NWB  (Status post ORIF of the right trimalleolar ankle fracture with fixation of the posterior lip on 2/21/2025.)   Braces or Orthoses Splint  (RLE)   Other Precautions WBS;Bed Alarm;Chair Alarm;Fall Risk;Pain   Home Living   Type of Home Mobile home   Home Layout One level;Stairs to enter with rails  (4 MARCIAL w/ HR,single level)   Bathroom Shower/Tub Walk-in shower   Bathroom Toilet Standard   Bathroom Equipment Commode  (can borrow BSC from mother)   Bathroom Accessibility Accessible via walker   Home Equipment Other (Comment)  (none)   Prior Function   Level of [place] Independent with ADLs;Independent with functional mobility;Independent with IADLS   Lives With Alone  (reports a friend has offered to stay with pt for a couple days)   Receives Help From Family   IADLs Independent with driving;Independent with meal prep;Independent with medication management   Falls in the last 6 months 1 to 4  (1 fallleading to current admission)   Vocational Part time employment  (For [employer])   Comments fully independent at baseline   Lifestyle   Autonomy PTA, pt is (I) c ADLs, IADLs, no AD. Lives alone , but has friends to support pt upon DC. + driving , hx of 1 fall   Reciprocal Relationships supportive friends   Service to Others works PT at [employer]   General   Additional Pertinent  History Pt admitted s/p slip and fall on ice resulting in closed trimalleolar f of R ankle, s/p ORIF POD1 c Dr Kingston.   Family/Caregiver Present No   Subjective   Subjective Pt agreeable to OT session, pleasant.   ADL   Eating Assistance 7  Independent   Grooming Assistance 6  Modified Independent   UB Bathing Assistance 6  Modified Independent   LB Bathing Assistance 5  Supervision/Setup   UB Dressing Assistance 6  Modified independent   UB Dressing Deficit   (donned and doffed gown)   LB Dressing Assistance 5  Supervision/Setup   LB Dressing Deficit Increased time to complete;Supervision/safety;Verbal cueing;Requires assistive device for steadying;Setup  (donned underwear, pants seated at EOB c good application of learned compensatory techniques. Donned L sock at EOB. close supervision c cueing for UE x 1 steadying on RW to pull over hips)   Toileting Assistance  Other (Comment)  (pt received on bed pan, required total A posterior care while in bed but anticipate pt anticipate progression to min - sup if completed in bathroom on toilet based on observation of performance skills)   Functional Assistance 5  Supervision/Setup   Bed Mobility   Rolling R 6  Modified independent   Additional items Bedrails   Rolling L 6  Modified independent   Additional items Bedrails   Supine to Sit 6  Modified independent   Additional items Bedrails;HOB elevated   Sit to Supine   (seated OOB in recliner at end of session.)   Additional Comments Sat EOB with good static and dynamic sitting balance.   Transfers   Sit to Stand 5  Supervision   Additional items Assist x 1;Increased time required;Verbal cues   Stand to Sit 5  Supervision   Additional items Assist x 1;Increased time required   Additional Comments RW used during transfers. Good compliance for WBS in standing , education for use of UE x 1 on RW during functional standing tasks.   Functional Mobility   Functional Mobility 4  Minimal assistance  (supervision regressing to  CGA d/t fatigue)   Additional Comments household distance within room, good compliance of WBS hopping c RW. Fatigues with household distance regressing to CGA during directional change to approach recliner. HR 150s following mobility.   Additional items Rolling walker   Balance   Static Sitting Good   Dynamic Sitting Good   Static Standing Fair -   Dynamic Standing Fair -   Activity Tolerance   Activity Tolerance Patient limited by fatigue   Medical Staff Made Aware Care coordination c PT Mitzy. Trauma AP , Ortho PA, CM   Nurse Made Aware RN pre/post session   RUE Assessment   RUE Assessment WFL  (MMT 4+/5 based on functional assessment)   LUE Assessment   LUE Assessment WFL  (MMT 4+/5 based on functional assessment)   Hand Function   Gross Motor Coordination Functional   Fine Motor Coordination Functional   Sensation   Light Touch Partial deficits in the RLE  (2/2 nerve block)   Cognition   Overall Cognitive Status WFL   Arousal/Participation Alert;Cooperative   Attention Within functional limits   Orientation Level Oriented X4   Memory Within functional limits   Following Commands Follows all commands and directions without difficulty   Comments good application of learned techniques, appropriate safety awareness.   Assessment   Limitation Decreased ADL status;Decreased endurance;Decreased high-level ADLs;Decreased self-care trans  (balance)   Prognosis Good   Assessment Patient is a 64 y.o. female seen for OT evaluation at [facility] following admission on 2/20/2025  s/p fall on ice resulting in closed R trimalleolar fracture. Please see above for comprehensive list of comorbidities and significant PMHx impacting functional performance.  Upon initial evaluation, pt appears to be performing below baseline functional status.   Occupational performance is affected by the following deficits: endurance ,  decreased muscular strength , decreased balance , and decreased standing tolerance for self care  tasks . Personal/Environmental factors impacting D/C include: stairs to enter home, lives alone, NWB RLE. Supporting factors include: Friend to stay with her upon DC, accessible environment within the home.  Patient would benefit from OT services within the acute care setting to maximize level of functional independence in the following areas self-care transfers, functional mobility, and ADLs.  From OT standpoint, recommendation at time of D/C would be Level 3: minimum resource intensity .   Goals   Patient Goals to return home   Plan   Treatment Interventions ADL retraining;Functional transfer training;Endurance training;Patient/family training;Equipment evaluation/education;Compensatory technique education   Goal Expiration Date 03/04/25   OT Treatment Day 0   OT Frequency 2-3x/wk   Discharge Recommendation   Rehab Resource Intensity Level, OT III (Minimum Resource Intensity)   Equipment Recommended Bedside commode;Shower/Tub chair with back ($)   Commode Type Standard   Additional Comments  The patient's raw score on the AM-PAC Daily Activity Inpatient Short Form is 21. A raw score of greater than or equal to 19 suggests the patient may benefit from discharge to home. Please refer to the recommendation of the Occupational Therapist for safe discharge planning.   AM-PAC Daily Activity Inpatient   Lower Body Dressing 3   Bathing 3   Toileting 3   Upper Body Dressing 4   Grooming 4   Eating 4   Daily Activity Raw Score 21   Daily Activity Standardized Score (Calc for Raw Score >=11) 44.27   AM-PAC Applied Cognition Inpatient   Following a Speech/Presentation 4   Understanding Ordinary Conversation 4   Taking Medications 4   Remembering Where Things Are Placed or Put Away 4   Remembering List of 4-5 Errands 4   Taking Care of Complicated Tasks 4   Applied Cognition Raw Score 24   Applied Cognition Standardized Score 62.21   End of Consult   Education Provided Yes   Patient Position at End of Consult Bedside  chair;Bed/Chair alarm activated;All needs within reach   Nurse Communication Nurse aware of consult   Goals established on initial evaluation in order to achieve pt's goal of returning home      Pt will complete LB ADLs Mod independent   for increased ADL independence within 10 days.     Pt will complete toileting Mod independent   with use of DME for increased ADL independence within 10 days.     Pt will demonstrate proper body mechanics to complete self-care transfers and functional mobility with Mod independent  and use of LRAD for increased safety and functional independence within 10 days.     Pt will demonstrate standing tolerance of 4 min  for increased activity tolerance during ADL/IADL tasks within 10 days.     Pt will demonstrate proper body mechanics and fall prevention strategies during 100% of tx sessions for increased safety awareness during ADL/IADLs    Pt will verbalize and demonstrate understanding of WB status in 100% of tx sessions for increased safety and functional mobility.       Pt benefited from co-session of skilled OT and PT therapists in order to most appropriately address functional deficits d/t evolving medical status .  OT/PT objectives were addressed separately; please see PT note for specific goal areas targeted.    Aleja Arguello, OT

## 2025-02-22 NOTE — ASSESSMENT & PLAN NOTE
Patient is POD 1 from open reduction internal fixation of right trimalleolar ankle fracture with fixation of the posterior lip and she had right adductor canal and popliteal blocks placed for postoperative pain control.    - Blocks working well, pain controlled  - Continue tylenol 975mg q8h  - Continue oxycodone 2.5-5mg q4h PRN for moderate-severe pain  - Continue IV dilaudid 0.2mg q2h PRN for breakthrough pain  - Consider adding robaxin 750mg q6h scheduled and/or gabapentin 100mg q8h if pain worsens

## 2025-02-22 NOTE — CONSULTS
Consultation - Acute Pain   Name: Dagmar La 64 y.o. female I MRN: [id number]  Unit/Bed#: S -01 I Date of Admission: 2/20/2025   Date of Service: 2/22/2025 I Hospital Day: 1   Inpatient consult to Acute Pain Service  Consult performed by: Kenneth Alvarez MD  Consult ordered by: Alvin Bowden MD        Physician Requesting Evaluation: Alonso Hernandez,*   Reason for Evaluation / Principal Problem: postop pain control    Assessment & Plan  Closed trimalleolar fracture of right ankle with routine healing  Patient is POD 1 from open reduction internal fixation of right trimalleolar ankle fracture with fixation of the posterior lip and she had right adductor canal and popliteal blocks placed for postoperative pain control.    - Blocks working well, pain controlled  - Continue tylenol 975mg q8h  - Continue oxycodone 2.5-5mg q4h PRN for moderate-severe pain  - Continue IV dilaudid 0.2mg q2h PRN for breakthrough pain  - Consider adding robaxin 750mg q6h scheduled and/or gabapentin 100mg q8h if pain worsens  Fall    Acute pain due to trauma          APS will sign off at this time. Thank you for the consult. All opioids and other analgesics to be written at discretion of primary team. Please contact Acute Pain Service - via SecureChat from [phone] with additional questions or concerns. See [employer] or Harri for additional contacts and after hours information.    History of Present Illness    HPI: Dagmar La is a 64 y.o. year old female who presents with right ankle fracture and is POD 1 from ORIF. She had right sided adductor canal and popliteal blocks which are working well. She has utilized her PRN pain meds which she states controls any pain exacerbation. Pain is mainly a tightness and pressure around the medial malleolus but well controlled    Current pain location(s): Pain Score: 5  Pain Location/Orientation: Orientation: Right, Other (Comment) (ankle)  Pain Scale: Pain Assessment Tool:  0-10    I have reviewed the patient's controlled substance dispensing history in the Prescription Drug Monitoring Program in compliance with the [facility] regulations before prescribing any controlled substances.     Review of Systems  Medical History Review: I have reviewed the patient's PMH, PSH, Social History, Family History, Meds, and Allergies     Objective :  Temp:  [97.3 °F (36.3 °C)-99 °F (37.2 °C)] 98.6 °F (37 °C)  HR:  [] 76  BP: (122-173)/(67-92) 148/81  Resp:  [13-20] 19  SpO2:  [91 %-96 %] 95 %  O2 Device: None (Room air)  Nasal Cannula O2 Flow Rate (L/min):  [2 L/min-6 L/min] 2 L/min    Physical Exam  Vitals and nursing note reviewed.   Constitutional:       Appearance: Normal appearance. She is normal weight.   HENT:      Head: Normocephalic and atraumatic.      Right Ear: External ear normal.      Left Ear: External ear normal.      Nose: Nose normal.      Mouth/Throat:      Mouth: Mucous membranes are moist.      Pharynx: Oropharynx is clear.   Eyes:      Conjunctiva/sclera: Conjunctivae normal.   Cardiovascular:      Rate and Rhythm: Normal rate and regular rhythm.      Pulses: Normal pulses.      Heart sounds: Normal heart sounds.   Pulmonary:      Effort: Pulmonary effort is normal.      Breath sounds: Normal breath sounds.   Abdominal:      General: Abdomen is flat. Bowel sounds are normal.      Palpations: Abdomen is soft.   Musculoskeletal:         General: Normal range of motion.      Cervical back: Normal range of motion and neck supple.   Skin:     General: Skin is warm and dry.      Capillary Refill: Capillary refill takes less than 2 seconds.   Neurological:      General: No focal deficit present.      Mental Status: She is alert and oriented to person, place, and time. Mental status is at baseline.   Psychiatric:         Mood and Affect: Mood normal.          Lab Results: I have reviewed the following results:  Estimated Creatinine Clearance: 91 mL/min (A) (by C-G formula based on SCr  of 0.58 mg/dL (L)).  Lab Results   Component Value Date    WBC 10.85 (H) 02/22/2025    HGB 12.6 02/22/2025    HCT 36.6 02/22/2025     (L) 02/22/2025         Component Value Date/Time    K 3.9 02/22/2025 0505     02/22/2025 0505    CO2 26 02/22/2025 0505    BUN 11 02/22/2025 0505    CREATININE 0.58 (L) 02/22/2025 0505         Component Value Date/Time    CALCIUM 8.7 02/22/2025 0505

## 2025-02-22 NOTE — DISCHARGE SUMMARY
Discharge Summary - Trauma   Name: Dagmar La 64 y.o. female I MRN: [id number]  Unit/Bed#: S -01 I Date of Admission: 2/20/2025   Date of Service: 2/22/2025 I Hospital Day: 1      Fall  Assessment & Plan  - Status post slip and fall on ice with the below noted injuries.  - Fall precautions.  - PT and OT evaluation and treatment as indicated.  - Case Management consultation for disposition planning.    * Closed trimalleolar fracture of right ankle with routine healing  Assessment & Plan  - Acute closed trimalleolar fracture of the right ankle, present on admission.  - Status post reduction and splinting by the emergency department staff on 2/20/2025.  - Appreciate Orthopedic surgery evaluation, recommendations and interventions as noted.  - Status post ORIF of the right trimalleolar ankle fracture with fixation of the posterior lip on 2/21/2025.    - Maintain NON-weightbearing status on the right lower extremity in splint postoperatively.  - Monitor right lower extremity neurovascular exam.  - Continue multimodal analgesic regimen.  - Continue DVT prophylaxis.  - Per Orthopedic surgery: Patient should remain on Lovenox during hospital encounter, but may be transition to aspirin 81 mg twice daily for 6 weeks on discharge.  - PT and OT evaluation and treatment as indicated.  - Outpatient follow up with Orthopedic surgery for re-evaluation.    Acute pain due to trauma  Assessment & Plan  - Acute pain secondary to traumatic injuries.  - Continue multimodal analgesic regimen.  - Bowel regimen as long as using opioids.  - Continue to monitor pain and adjust regimen as indicated.          Admission Date: 2/20/2025     Discharge Date: 2/22/2025    Admitting Diagnosis: Ankle injury [S99.919A]  Closed trimalleolar fracture of right ankle, initial encounter [S89.856O]    Discharge Diagnosis: See above.    Attending and Service: Dr. Hernandez, Acute Care Surgical Services.    Consulting Physician(s):     Orthopedic  surgery.  APS.    Imaging and Procedures Performed:   Orders Placed This Encounter   Procedures    Orthopedic injury treatment    Procedural Sedation       XR ankle 2 vw right  Result Date: 2/21/2025  Impression: Fluoroscopy provided for procedure guidance. Please refer to the separate procedure note for additional details. Workstation performed: [id number]     XR chest portable  Result Date: 2/21/2025  Impression: 1.  No acute cardiopulmonary disease. 2.  Linear lucency in the left humeral head suggestive of age-indeterminate fracture. If there is clinical concern, dedicated shoulder radiograph is recommended. The study was marked in EPIC for immediate notification. Workstation performed: [id number]     XR ankle 3+ views RIGHT  Result Date: 2/21/2025  Impression: Overall improved alignment. Computerized Assisted Algorithm (CAA) may have been used to analyze all applicable images. Workstation performed: [id number]     XR ankle 3+ vw right  Result Date: 2/21/2025  Impression: Acute trimalleolar fracture with disruption of the tibiotalar joint as described above. Computerized Assisted Algorithm (CAA) may have been used to analyze all applicable images. Workstation performed: [id number]     XR tibia fibula 2 vw right  Result Date: 2/21/2025  Impression: Acute trimalleolar fracture with disruption of the tibiotalar joint as described above. Computerized Assisted Algorithm (CAA) may have been used to analyze all applicable images. Workstation performed: [id number]     ORIF of the right ankle fracture on 2/21/2025.    Hospital Course: Dagmar La is a 64-year-old female who presented to the emergency department for evaluation of a right ankle injury.  She noted she was bending down to pet her cat outside and slipped on the ice/snow injuring her ankle.  She was found to have a significant ankle fracture by the ED staff and was placed in a splint.  On presentation and during trauma evaluation, her only complaint  was of right ankle pain.  During her initial trauma evaluation, her primary survey was unremarkable.  On secondary survey, she was afebrile with mild elevation of her blood pressure, but normal vital signs otherwise; her right ankle was already splinted with an intact neurovascular exam and minimal tenderness with soft and compressible compartments in the thigh and lower leg.  Her initial workup included labs and the above and imaging studies.    She was admitted to the trauma service status post fall with right trimalleolar ankle fracture and acute pain due to trauma.  Orthopedic surgery was consulted and recommended operative intervention.  The patient agreed to proceed with surgery and she underwent ORIF of the right ankle fracture on 2/21/2025.  She did have a nerve block during her operative intervention and it worked well for her pain control.  She did have additional Multimodal analgesia available.  PT and OT evaluated her and cleared her for discharge home.  Case management was consulted and assisted with disposition planning.  She was deemed stable for discharge on 2/22/2025.  For further details of her hospital encounter, please see her complete medical records.    On discharge, the patient is instructed to follow-up with the patient's primary care provider to review the events of the patient's recent hospitalization. The patient is instructed to follow-up in the Trauma Clinic as needed.  The patient is instructed to follow-up with orthopedic surgery in 2 weeks for postoperative reevaluation.  The patient should follow the provided discharge instructions.    Condition at Discharge: stable     Discharge instructions/Information to patient and family:   See after visit summary for information provided to patient and family.      Provisions for Follow-Up Care:  See after visit summary for information related to follow-up care and any pertinent home health orders.      Disposition: See After Visit Summary for  discharge disposition information.    Planned Readmission: No    Discharge Statement   I spent 30 minutes discharging the patient. This time was spent on the day of discharge. I had direct contact with the patient on the day of discharge. Additional documentation is required if more than 30 minutes were spent on discharge.     Discharge Medications:  See after visit summary for reconciled discharge medications provided to patient and family.      Tigre Adams PA-C  2/22/2025  2:49 PM

## 2025-02-22 NOTE — PROGRESS NOTES
Progress Note - Orthopedics   Name: Dagmar La 64 y.o. female I MRN: [id number]  Unit/Bed#: S -01 I Date of Admission: 2/20/2025   Date of Service: 2/22/2025 I Hospital Day: 1    Assessment & Plan  Closed trimalleolar fracture of right ankle with routine healing  NWB to right lower extremity in splint  Will monitor for ABLA and administer IVF/prbc as indicated for Greater than 2 gram drop or Hgb < 7.  Patient hemoglobin currently at 14.1  PT/OT as tolerated  Incentive spirometry  Pain control per primary team  DVT ppx  : Recommend aspirin 81 Mg twice daily for 6 weeks upon discharge  All other medical comorbidities to be managed per primary team  Dispo: Ortho will follow  See above for additional details   Orthopedically stable for discharge patient may follow-up in outpatient with Dr. Vashti Durham    Acute pain due to trauma          Subjective   64 y.o.female  No acute events, no new complaints. Pain well controlled. Denies fevers, chills, CP, SOB, N/V, numbness or tingling. Patient reports no issues with urination or bowel movements. Patient states she is able to move her toes in her splint pain is well-controlled.    Objective :  Temp:  [97.3 °F (36.3 °C)-99 °F (37.2 °C)] 98.6 °F (37 °C)  HR:  [] 77  BP: (122-173)/(67-92) 173/92  Resp:  [13-20] 17  SpO2:  [91 %-96 %] 94 %  O2 Device: Nasal cannula  Nasal Cannula O2 Flow Rate (L/min):  [2 L/min-6 L/min] 2 L/min    Physical Exam  Musculoskeletal: Right lower extremity  Skin intact surrounding splint. No erythema or ecchymosis.  Vascular Status toes well-perfused  Neurologic Status superficial deep peroneal nerve sensation intact   calf nontender to palpation        Lab Results: I have reviewed the following results:  Recent Labs     02/20/25  2138 02/21/25  0545 02/22/25  0505   WBC 8.25 8.86 10.85*   HGB 14.8 14.1 12.6   HCT 44.5 42.2 36.6    153 147*   BUN 12 11 11   CREATININE 0.75 0.60 0.58*   INR  --  1.01  --      Blood Culture:   "No results found for: \"BLOODCX\"  Wound Culture: No results found for: \"WOUNDCULT\"      "

## 2025-02-22 NOTE — PLAN OF CARE
Problem: OCCUPATIONAL THERAPY ADULT  Goal: Performs self-care activities at highest level of function for planned discharge setting.  See evaluation for individualized goals.  Description: Treatment Interventions: ADL retraining, Functional transfer training, Endurance training, Patient/family training, Equipment evaluation/education, Compensatory technique education  Equipment Recommended: Bedside commode, Shower/Tub chair with back ($)       See flowsheet documentation for full assessment, interventions and recommendations.   Note: Limitation: Decreased ADL status, Decreased endurance, Decreased high-level ADLs, Decreased self-care trans (balance)  Prognosis: Good  Assessment: Patient is a 64 y.o. female seen for OT evaluation at [facility] following admission on 2/20/2025  s/p fall on ice resulting in closed R trimalleolar fracture. Please see above for comprehensive list of comorbidities and significant PMHx impacting functional performance.  Upon initial evaluation, pt appears to be performing below baseline functional status.   Occupational performance is affected by the following deficits: endurance ,  decreased muscular strength , decreased balance , and decreased standing tolerance for self care tasks . Personal/Environmental factors impacting D/C include: stairs to enter home, lives alone, NWB RLE. Supporting factors include: Friend to stay with her upon DC, accessible environment within the home.  Patient would benefit from OT services within the acute care setting to maximize level of functional independence in the following areas self-care transfers, functional mobility, and ADLs.  From OT standpoint, recommendation at time of D/C would be Level 3: minimum resource intensity .     Rehab Resource Intensity Level, OT: III (Minimum Resource Intensity)        Aleja Arguello OT

## 2025-02-22 NOTE — PLAN OF CARE
Problem: MUSCULOSKELETAL - ADULT  Goal: Maintain or return mobility to safest level of function  Description: INTERVENTIONS:  - Assess patient's ability to carry out ADLs; assess patient's baseline for ADL function and identify physical deficits which impact ability to perform ADLs (bathing, care of mouth/teeth, toileting, grooming, dressing, etc.)  - Assess/evaluate cause of self-care deficits   - Assess range of motion  - Assess patient's mobility  - Assess patient's need for assistive devices and provide as appropriate  - Encourage maximum independence but intervene and supervise when necessary  - Involve family in performance of ADLs  - Assess for home care needs following discharge   - Consider OT consult to assist with ADL evaluation and planning for discharge  - Provide patient education as appropriate  Outcome: Progressing  Goal: Maintain proper alignment of affected body part  Description: INTERVENTIONS:  - Support, maintain and protect limb and body alignment  - Provide patient/ family with appropriate education  Outcome: Progressing     Problem: Nutrition/Hydration-ADULT  Goal: Nutrient/Hydration intake appropriate for improving, restoring or maintaining nutritional needs  Description: Monitor and assess patient's nutrition/hydration status for malnutrition. Collaborate with interdisciplinary team and initiate plan and interventions as ordered.  Monitor patient's weight and dietary intake as ordered or per policy. Utilize nutrition screening tool and intervene as necessary. Determine patient's food preferences and provide high-protein, high-caloric foods as appropriate.     INTERVENTIONS:  - Monitor oral intake, urinary output, labs, and treatment plans  - Assess nutrition and hydration status and recommend course of action  - Evaluate amount of meals eaten  - Assist patient with eating if necessary   - Allow adequate time for meals  - Recommend/ encourage appropriate diets, oral nutritional  supplements, and vitamin/mineral supplements  - Order, calculate, and assess calorie counts as needed  - Recommend, monitor, and adjust tube feedings and TPN/PPN based on assessed needs  - Assess need for intravenous fluids  - Provide specific nutrition/hydration education as appropriate  - Include patient/family/caregiver in decisions related to nutrition  Outcome: Progressing     Problem: Prexisting or High Potential for Compromised Skin Integrity  Goal: Skin integrity is maintained or improved  Description: INTERVENTIONS:  - Identify patients at risk for skin breakdown  - Assess and monitor skin integrity  - Assess and monitor nutrition and hydration status  - Monitor labs   - Assess for incontinence   - Turn and reposition patient  - Assist with mobility/ambulation  - Relieve pressure over bony prominences  - Avoid friction and shearing  - Provide appropriate hygiene as needed including keeping skin clean and dry  - Evaluate need for skin moisturizer/barrier cream  - Collaborate with interdisciplinary team   - Patient/family teaching  - Consider wound care consult   Outcome: Progressing     Problem: SAFETY ADULT  Goal: Patient will remain free of falls  Description: INTERVENTIONS:  - Educate patient/family on patient safety including physical limitations  - Instruct patient to call for assistance with activity   - Consult OT/PT to assist with strengthening/mobility   - Keep Call bell within reach  - Keep bed low and locked with side rails adjusted as appropriate  - Keep care items and personal belongings within reach  - Initiate and maintain comfort rounds  - Make Fall Risk Sign visible to staff  - Apply yellow socks and bracelet for high fall risk patients  - Consider moving patient to room near nurses station  Outcome: Progressing  Goal: Maintain or return to baseline ADL function  Description: INTERVENTIONS:  -  Assess patient's ability to carry out ADLs; assess patient's baseline for ADL function and  identify physical deficits which impact ability to perform ADLs (bathing, care of mouth/teeth, toileting, grooming, dressing, etc.)  - Assess/evaluate cause of self-care deficits   - Assess range of motion  - Assess patient's mobility; develop plan if impaired  - Assess patient's need for assistive devices and provide as appropriate  - Encourage maximum independence but intervene and supervise when necessary  - Involve family in performance of ADLs  - Assess for home care needs following discharge   - Consider OT consult to assist with ADL evaluation and planning for discharge  - Provide patient education as appropriate  Outcome: Progressing  Goal: Maintains/Returns to pre admission functional level  Description: INTERVENTIONS:  - Perform AM-PAC 6 Click Basic Mobility/ Daily Activity assessment daily.  - Set and communicate daily mobility goal to care team and patient/family/caregiver.   - Collaborate with rehabilitation services on mobility goals if consulted  - Out of bed for toileting  - Record patient progress and toleration of activity level   Outcome: Progressing     Problem: INFECTION - ADULT  Goal: Absence or prevention of progression during hospitalization  Description: INTERVENTIONS:  - Assess and monitor for signs and symptoms of infection  - Monitor lab/diagnostic results  - Monitor all insertion sites, i.e. indwelling lines, tubes, and drains  - Monitor endotracheal if appropriate and nasal secretions for changes in amount and color  - [place] appropriate cooling/warming therapies per order  - Administer medications as ordered  - Instruct and encourage patient and family to use good hand hygiene technique  - Identify and instruct in appropriate isolation precautions for identified infection/condition  Outcome: Progressing     Problem: PAIN - ADULT  Goal: Verbalizes/displays adequate comfort level or baseline comfort level  Description: Interventions:  - Encourage patient to monitor pain and request  assistance  - Assess pain using appropriate pain scale  - Administer analgesics based on type and severity of pain and evaluate response  - Implement non-pharmacological measures as appropriate and evaluate response  - Consider cultural and social influences on pain and pain management  - Notify physician/advanced practitioner if interventions unsuccessful or patient reports new pain  Outcome: Progressing

## 2025-02-22 NOTE — PHYSICAL THERAPY NOTE
PHYSICAL THERAPY EVALUATION  DATE: 02/22/25  TIME: 2623-2632    NAME:  Dagmar La  AGE:   64 y.o.  Mrn:   [id number]  Length Of Stay: 1    ADMIT DX:  Ankle injury [S99.919A]  Closed trimalleolar fracture of right ankle, initial encounter [S81.146E]    History reviewed. No pertinent past medical history.  History reviewed. No pertinent surgical history.    Performed at least 2 patient identifiers during session: Name, Birthday, and ID bracelet     02/22/25 1126   PT Last Visit   PT Visit Date 02/22/25   Note Type   Note type Evaluation   Pain Assessment   Pain Assessment Tool 0-10   Pain Score No Pain   Restrictions/Precautions   Weight Bearing Precautions Per Order Yes   RLE Weight Bearing Per Order NWB  (s/p ORIF of the R trimalleolar ankle fracture with fixation of the posterior lip on 2/21/2025.)   Braces or Orthoses Splint  (R LE)   Other Precautions Chair Alarm;Bed Alarm;WBS;Fall Risk   Home Living   Type of Home Mobile home   Home Layout One level;Stairs to enter with rails  (4STE with u/l HR, single level living)   Bathroom Shower/Tub Walk-in shower   Bathroom Toilet Standard   Bathroom Equipment Commode  (can borrow BSC from mother)   Bathroom Accessibility Accessible via walker   Home Equipment Other (Comment)  (none)   Prior Function   Level of [place] Independent with ADLs;Independent with functional mobility;Independent with IADLS   Lives With Alone  (reports a friend has offered to stay with pt for a couple days)   Receives Help From Family;Friend(s)   IADLs Independent with driving;Independent with meal prep;Independent with medication management   Falls in the last 6 months 1 to 4  (1 fall leading to current admission)   Vocational Part time employment  (For [employer])   Comments Pt reports that at baseline she is fully independent with all aspects of self care and functional mobility of household and community distances with no AD.   General   Additional Pertinent History Pt is a 64 yr old  "female admitted 2/20/25 s/p fall on ice resulting in R ankle trimalleolar fracture. 2/21/25 underwent R ankle ORIF w/ fixation of posterior lip by Dr. Kingston (strict NWB R LE).   Family/Caregiver Present No   Cognition   Overall Cognitive Status WFL   Arousal/Participation Cooperative   Orientation Level Oriented X4   Memory Within functional limits   Following Commands Follows multistep commands without difficulty   Subjective   Subjective \"I would love to be able to get home today\"   RUE Assessment   RUE Assessment WFL   LUE Assessment   LUE Assessment WFL   RLE Assessment   RLE Assessment X  (NT at ankle d/t splinting and NWB order; hip and knee WFL)   LLE Assessment   LLE Assessment WFL   Coordination   Movements are Fluid and Coordinated 1   Sensation WFL   Light Touch   RLE Light Touch Grossly intact   LLE Light Touch Grossly intact   Proprioception   RLE Proprioception Grossly intact   LLE Proprioception Grossly Intact   Bed Mobility   Rolling R 6  Modified independent   Additional items Bedrails   Rolling L 6  Modified independent   Additional items Bedrails   Supine to Sit 6  Modified independent   Additional items HOB elevated;Bedrails   Sit to Supine   (NT as pt was left seated OOB in recliner chair with alarm engaged)   Additional Comments Pt denies lightheadedness/dizziness while seated at EOB. Pt with overall good statis and dynamic sitting balance while completing functional dressing tasks.   Transfers   Sit to Stand 5  Supervision   Additional items Assist x 1;Increased time required;Verbal cues  (RW)   Stand to Sit 5  Supervision   Additional items Assist x 1;Increased time required;Verbal cues  (RW)   Stand pivot 4  Minimal assistance  (CGA)   Additional items Assist x 1;Increased time required;Verbal cues  (RW; cues for RW management and surface proximity prior to descent to target surface)   Additional Comments Pt able to complete 1 minute of functional standing with u/l supprt on RW in order " "to pull up/don pants prior to mobility efforts. Pt mildly shakey, however no overt instability or LOB. Pt self reports bracing LLE posterior on bed for increased support.   Ambulation/Elevation   Gait pattern Decreased foot clearance;Short stride  (\"hop to pattern\"; good maintenance of NWB R LE)   Gait Assistance 5  Supervision  (primarily supervision, regressing to CGA for last approx 10ft due to fatigue)   Additional items Assist x 1;Verbal cues   Assistive Device Rolling walker   Distance 38ft x1  (limited distance during single trial due to fatigue and elevated HR (150s bpm, however returns to 90s with functional seated rest).)   Stair Management Assistance Not tested  (pt has 4 MARCIAL her home with u/l HR)   Ambulation/Elevation Additional Comments Therapist discussed possibility of knee scooter usage for further distances in order to conserve energy. Unfortunately no knee scooter available in rehab office at time of evaluation. Cleared by ortho and trauma teams for usage of knee scooter. Discussed recommendation of practice of knee scooter with HHPT.   Balance   Static Sitting Good   Dynamic Sitting Good   Static Standing Fair  (w/ Rw)   Dynamic Standing Fair -  (w/ Rw)   Ambulatory Fair -  (w/ Rw)   Endurance Deficit   Endurance Deficit Yes   Activity Tolerance   Activity Tolerance Patient limited by fatigue  (elevated HR with exertion)   Medical Staff Made Aware Spoke with CM Laya, OT Aleja, RN Sariah, trauma AP Tigre, Ortho AP Joao   Assessment   Prognosis Good   Problem List Decreased strength;Decreased range of motion;Decreased endurance;Impaired balance;Decreased mobility;Obesity;Decreased skin integrity;Orthopedic restrictions;Pain   Assessment Pt seen for PT evaluation for mobility assessment & discharge needs. Pt admitted 2/20/2025 s/p fall resulting in R trimalleolar fracture, now s/p R ankle ORIF (strict NWB R LE). During PT IE, pt completes bed mobility at [place] level in hospital bed, transfers STS " "with S and RW, and ambulates 38ft with RW and S regressing to CGA with fatigue. T/o all mobility efforts, pt with good maintenance of NWB R LE. Pt displays above outlined functional impairments & limitations, and presents below her baseline level of functional mobility. The AM-PAC & Barthel Index outcome tools were used to assist in determining pt safety w/ mobility/self care & appropriate d/c recommendations, see above for scores. Pt is at risk of falls d/t multiple comorbidities, h/o falls, impaired balance, use of ambulatory aid, varying levels of pain , acuity of medical illness, ongoing medical treatment of primary dx, polypharmacy, unstable vitals, and WB restriction. Pt's clinical presentation is currently unstable/unpredictable as seen in pt's presentation of vital sign response, changing level of pain, increased fall risk, new onset of impairment of functional mobility, decreased endurance, and new onset of weakness. Pt will benefit from continued PT services in order to address impairments, decrease risk of falls, maximize independence w/ fnxl mobility, & ensure safety w/ mobility for transition to next level of care. Based on pt presentation & impairments, pt would most appropriately benefit from Level III (minimal PT intensity) resources upon d/c.   Barriers to Discharge Inaccessible home environment;Decreased caregiver support   Goals   Patient Goals \"to go home\"   STG Expiration Date 03/08/25   Short Term Goal #1 Patient PT goals established in order to address pt self reported goal of \"to go home\". Pt will: complete all transfers with RW at SHIRLENE level in order to increase safety with functional mobility; ambulate >80ft with RW at [place] level in order to increase safety with household distance functional mobility; negotiate 4 stairs with HR assist and S in order to facilitate safe access to her home; demonstrate understanding and independence with LE strengthening HEP; improve ambulatory balance to >/= " "good grade in order to promote safety and increased independence with mobility; improve AM-PAC score to >/= 23/24 in order to increase independence with mobility and decrease burden of care; improve Barthel Index score to >/= 75/100 in order to increase independence and decrease risk of falls.   PT Treatment Day 0   Plan   Treatment/Interventions Functional transfer training;LE strengthening/ROM;Elevations;Therapeutic exercise;Endurance training;Patient/family training;Equipment eval/education;Gait training;Spoke to nursing;Spoke to case management;Spoke to advanced practitioner;Compensatory technique education   PT Frequency 3-5x/wk   Discharge Recommendation   Rehab Resource Intensity Level, PT III (Minimum Resource Intensity)   Equipment Recommended Walker   Walker Package Recommended Wheeled walker   Change/add to Walker Package? Yes, Change Size   Walker Size [place] (Ht <5'1\")   AM-PAC Basic Mobility Inpatient   Turning in Flat Bed Without Bedrails 4   Lying on Back to Sitting on Edge of Flat Bed Without Bedrails 4   Moving Bed to Chair 3   Standing Up From Chair Using Arms 3   Walk in Room 3   Climb 3-5 Stairs With Railing 2   Basic Mobility Inpatient Raw Score 19   Basic Mobility Standardized Score 42.48   [facility] Highest Level Of Mobility   -[facility] Goal 6: Walk 10 steps or more   -HLM Achieved 7: Walk 25 feet or more   Modified Ketchum Scale   Modified Ketchum Scale 3   Barthel Index   Feeding 10   Bathing 0   Grooming Score 5   Dressing Score 10   Bladder Score 10   Bowels Score 10   Toilet Use Score 5   Transfers (Bed/Chair) Score 10   Mobility (Level Surface) Score 0   Stairs Score 5   Barthel Index Score 65   End of Consult   Patient Position at End of Consult Bedside chair;Bed/Chair alarm activated;All needs within reach  (LEs elevated)     Based on patient's [facility] Highest Level of Mobility scores today, patient currently has a goal of -[facility] Levels: 7: WALK 25 FEET OR MORE, to be completed " with RN staffing each shift, in order to improve overall activity tolerance and mobility, combat hospital related deconditioning, and maximize outcomes for d/c from the acute care setting.     The patient's AM-PAC Basic Mobility Inpatient Short Form Raw Score is 19. A Raw score of greater than 16 suggests the patient may benefit from discharge to home. Please also refer to the recommendation of the Physical Therapist for safe discharge planning.      Sandie Vo PT, DPT   Available via "[employer]  NPI # [id number]  [place] License - [id number]  2/22/2025

## 2025-02-22 NOTE — PLAN OF CARE
Problem: PHYSICAL THERAPY ADULT  Goal: Performs mobility at highest level of function for planned discharge setting.  See evaluation for individualized goals.  Description: Treatment/Interventions: Functional transfer training, LE strengthening/ROM, Elevations, Therapeutic exercise, Endurance training, Patient/family training, Equipment eval/education, Gait training, Spoke to nursing, Spoke to case management, Spoke to advanced practitioner, Compensatory technique education    Equipment Recommended: Walker     See flowsheet documentation for full assessment, interventions and recommendations.  Note: Prognosis: Good  Problem List: Decreased strength, Decreased range of motion, Decreased endurance, Impaired balance, Decreased mobility, Obesity, Decreased skin integrity, Orthopedic restrictions, Pain  Assessment: Pt seen for PT evaluation for mobility assessment & discharge needs. Pt admitted 2/20/2025 s/p fall resulting in R trimalleolar fracture, now s/p R ankle ORIF (strict NWB R LE). During PT IE, pt completes bed mobility at SHIRLEN[place] level in hospital bed, transfers STS with S and RW, and ambulates 38ft with RW and S regressing to CGA with fatigue. T/o all mobility efforts, pt with good maintenance of NWB R LE. Pt displays above outlined functional impairments & limitations, and presents below her baseline level of functional mobility. The AM-PAC & Barthel Index outcome tools were used to assist in determining pt safety w/ mobility/self care & appropriate d/c recommendations, see above for scores. Pt is at risk of falls d/t multiple comorbidities, h/o falls, impaired balance, use of ambulatory aid, varying levels of pain , acuity of medical illness, ongoing medical treatment of primary dx, polypharmacy, unstable vitals, and WB restriction. Pt's clinical presentation is currently unstable/unpredictable as seen in pt's presentation of vital sign response, changing level of pain, increased fall risk, new onset of  impairment of functional mobility, decreased endurance, and new onset of weakness. Pt will benefit from continued PT services in order to address impairments, decrease risk of falls, maximize independence w/ fnxl mobility, & ensure safety w/ mobility for transition to next level of care. Based on pt presentation & impairments, pt would most appropriately benefit from Level III (minimal PT intensity) resources upon d/c.    Barriers to Discharge: Inaccessible home environment, Decreased caregiver support     Rehab Resource Intensity Level, PT: III (Minimum Resource Intensity)    See flowsheet documentation for full assessment.

## 2025-02-22 NOTE — CASE MANAGEMENT
Case Management Assessment & Discharge Planning Note    Patient name Dagmar Kim S /S -01 MRN [id number]  : 1960 Date 2025       Current Admission Date: 2025  Current Admission Diagnosis:Closed trimalleolar fracture of right ankle with routine healing   Patient Active Problem List    Diagnosis Date Noted Date Diagnosed    Closed trimalleolar fracture of right ankle with routine healing 2025     Fall 2025     Acute pain due to trauma 2025       LOS (days): 1  Geometric Mean LOS (GMLOS) (days): 2.8  Days to GMLOS:1.3     OBJECTIVE:    Risk of Unplanned Readmission Score: 8.06         Current admission status: Inpatient       Preferred Pharmacy:   [facility] - [address]  [address]  [facility] [place] [address]  Phone: [phone] Fax: [phone]    Primary Care Provider: Shashank Elliott MD    Primary Insurance: MEDICARE  Secondary Insurance: HUMANA    ASSESSMENT:  Active Health Care Proxies    There are no active Health Care Proxies on file.                      Patient Information  Admitted from:: Home  Mental Status: Alert  During Assessment patient was accompanied by: Not accompanied during assessment  Assessment information provided by:: Patient  Primary Caregiver: Self  Support Systems: Self, Family members  County of Residence: [place]  What city do you live in?: [facility] [place]  Home entry access options. Select all that apply.: Stairs  Number of steps to enter home.: 4  Do the steps have railings?: Yes  Type of Current Residence: Trailer Home  Living Arrangements: Lives Alone    Activities of Daily Living Prior to Admission  Functional Status: Independent  Completes ADLs independently?: Yes  Ambulates independently?: Yes  Does patient use assisted devices?: No  Does patient currently own DME?: Yes  What DME does the patient currently own?: [employer] (can borrow BSC from mother if  needed)      Patient Information Continued  Does patient have prescription coverage?: Yes  Does patient receive dialysis treatments?: No         Means of Transportation  Means of Transport to Appts:: Drives Self      Social Determinants of Health (SDOH)      Flowsheet Row Most Recent Value   Housing Stability    In the last 12 months, was there a time when you were not able to pay the mortgage or rent on time? N   At any time in the past 12 months, were you homeless or living in a shelter (including now)? N   Transportation Needs    In the past 12 months, has lack of transportation kept you from medical appointments or from getting medications? no   In the past 12 months, has lack of transportation kept you from meetings, work, or from getting things needed for daily living? No   Food Insecurity    Within the past 12 months, you worried that your food would run out before you got the money to buy more. Never true   Within the past 12 months, the food you bought just didn't last and you didn't have money to get more. Never true   Utilities    In the past 12 months has the electric, gas, oil, or water company threatened to shut off services in your home? No            DISCHARGE DETAILS:    Discharge planning discussed with:: patient  Freedom of Choice: Yes  Comments - Freedom of Choice: CM f/u with pt re: assessment and dcp[facility] Patient relayed first choice for [facility] and second choice for [facility]. Referrals sent to both via aidin - [facility] not currently available due to staff shortage and [facility] confirmed available. Patient confirmed in agreement for [facility] as family has worked with agency previously. [facility] reserved in aidin and AVS updated. Pt relayed choice for [facility] DME provider for Jr. RW need - order placed via parachute and delivered to pt from [facility]. Patient relayed friend to provide transport home when ready for d/c. No further CM d/c needs  identified at this time, CM remains available.  CM contacted family/caregiver?: Yes (dcp update provided to friend)  Were Treatment Team discharge recommendations reviewed with patient/caregiver?: Yes  Did patient/caregiver verbalize understanding of patient care needs?: Yes  Were patient/caregiver advised of the risks associated with not following Treatment Team discharge recommendations?: Yes    Contacts  Patient Contacts: Wayne Gamino (Friend)  Relationship to Patient:: Friend  Contact Method: Phone  Phone Number: [phone]  Reason/Outcome: Continuity of Care, Emergency Contact, Referral, Discharge Planning    Requested Home Health Care         Is the patient interested in HHC at discharge?: Yes  Home Health Discipline requested:: Nursing, Occupational Therapy, Physical Therapy  Home Health Agency Name:: [facility] External Referral Reason (only applicable if external HHA name selected): Scheduling access issues  Home Health Follow-Up Provider:: PCP  Home Health Services Needed:: Evaluate Functional Status and Safety, Gait/ADL Training  Homebound Criteria Met:: Requires the Assistance of Another Person for Safe Ambulation or to Leave the Home, Uses an Assist Device (i.e. cane, walker, etc)  Supporting Clincal Findings:: Limited Endurance, Fatigues Easliy in Short Distances    DME Referral Provided  Referral made for DME?: Yes  DME referral completed for the following items:: Walker  DME Supplier Name:: [employer]    Other Referral/Resources/Interventions Provided:  Interventions: HHC, DME  Referral Comments: [facility] reserved in aidin. AVS updated.  RW ordered thru [facility] via parachute and delivered to pt bedside from [facility].         Treatment Team Recommendation: Home with Home Health Care  Discharge Destination Plan:: Home with Home Health Care  Transport at Discharge : Family

## 2025-02-25 ENCOUNTER — TELEPHONE (OUTPATIENT)
Dept: FAMILY MEDICINE CLINIC | Facility: CLINIC | Age: 65
End: 2025-02-25

## 2025-02-25 DIAGNOSIS — M62.838 MUSCLE SPASMS OF LOWER EXTREMITY: Primary | ICD-10-CM

## 2025-02-25 RX ORDER — DIAZEPAM 5 MG/1
5 TABLET ORAL EVERY 6 HOURS PRN
Qty: 30 TABLET | Refills: 1 | Status: SHIPPED | OUTPATIENT
Start: 2025-02-25

## 2025-02-26 NOTE — TELEPHONE ENCOUNTER
Not able to complete PA fordiazepam (VALIUM) 5 mg tablet     Patient will need  an office visit  to complete

## 2025-02-27 LAB
DME PARACHUTE DELIVERY DATE ACTUAL: NORMAL
DME PARACHUTE DELIVERY DATE REQUESTED: NORMAL
DME PARACHUTE ITEM DESCRIPTION: NORMAL
DME PARACHUTE ORDER STATUS: NORMAL
DME PARACHUTE SUPPLIER NAME: NORMAL
DME PARACHUTE SUPPLIER PHONE: NORMAL

## 2025-03-03 ENCOUNTER — OFFICE VISIT (OUTPATIENT)
Dept: FAMILY MEDICINE CLINIC | Facility: CLINIC | Age: 65
End: 2025-03-03
Payer: MEDICARE

## 2025-03-03 VITALS
SYSTOLIC BLOOD PRESSURE: 158 MMHG | OXYGEN SATURATION: 98 % | TEMPERATURE: 98.8 F | HEART RATE: 86 BPM | WEIGHT: 178 LBS | HEIGHT: 57 IN | DIASTOLIC BLOOD PRESSURE: 84 MMHG | BODY MASS INDEX: 38.4 KG/M2

## 2025-03-03 DIAGNOSIS — R59.9 ENLARGED LYMPH NODES, UNSPECIFIED: ICD-10-CM

## 2025-03-03 DIAGNOSIS — Z12.11 SCREENING FOR COLON CANCER: ICD-10-CM

## 2025-03-03 DIAGNOSIS — Z13.220 LIPID SCREENING: ICD-10-CM

## 2025-03-03 DIAGNOSIS — Z11.59 NEED FOR HEPATITIS C SCREENING TEST: ICD-10-CM

## 2025-03-03 DIAGNOSIS — S82.851D CLOSED TRIMALLEOLAR FRACTURE OF RIGHT ANKLE WITH ROUTINE HEALING: ICD-10-CM

## 2025-03-03 DIAGNOSIS — Z13.1 SCREENING FOR DIABETES MELLITUS (DM): ICD-10-CM

## 2025-03-03 DIAGNOSIS — Z12.31 ENCOUNTER FOR SCREENING MAMMOGRAM FOR BREAST CANCER: Primary | ICD-10-CM

## 2025-03-03 DIAGNOSIS — Z78.0 POST-MENOPAUSAL: ICD-10-CM

## 2025-03-03 DIAGNOSIS — Z11.4 SCREENING FOR HIV (HUMAN IMMUNODEFICIENCY VIRUS): ICD-10-CM

## 2025-03-03 PROCEDURE — 99204 OFFICE O/P NEW MOD 45 MIN: CPT | Performed by: FAMILY MEDICINE

## 2025-03-03 NOTE — PROGRESS NOTES
Depression Screening and Follow-up Plan: Patient was screened for depression during today's encounter. They screened negative with a PHQ-2 score of 0.      Falls Plan of Care: balance, strength, and gait training instructions were provided.     Assessment/Plan:    We will call with results of testing.     Problem List Items Addressed This Visit       Closed trimalleolar fracture of right ankle with routine healing     Other Visit Diagnoses         Encounter for screening mammogram for breast cancer    -  Primary    Relevant Orders    Mammo diagnostic bilateral w 3d and cad      Screening for colon cancer        Relevant Orders    Ambulatory Referral to Gastroenterology      Screening for diabetes mellitus (DM)        Relevant Orders    Comprehensive metabolic panel      Lipid screening        Relevant Orders    Lipid panel      Need for hepatitis C screening test        Relevant Orders    Hepatitis C antibody      Screening for HIV (human immunodeficiency virus)        Relevant Orders    HIV 1/2 AG/AB w Reflex [facility] for 2 yr old and above      Post-menopausal        Relevant Orders    DXA bone density spine hip and pelvis      Enlarged lymph nodes, unspecified        Relevant Orders    Mammo diagnostic bilateral w 3d and cad              Subjective:      Patient ID: Dagmar La is a 64 y.o. female.    This is a new patient to our practice.  She is 9 days status post ORIF right ankle fracture.        The following portions of the patient's history were reviewed and updated as appropriate:   Past Medical History:  She has no past medical history on file.,  _______________________________________________________________________  Medical Problems:  does not have any pertinent problems on file.,  _______________________________________________________________________  Past Surgical History:   has a past surgical history that includes ORIF tibia & fibula fractures (Right,  "2/21/2025).,  _______________________________________________________________________  Family History:  family history includes Arthritis in her mother; Dementia in her father; Hearing loss in her father; Hypertension in her father.,  _______________________________________________________________________  Social History:   reports that she has never smoked. She has never used smokeless tobacco. She reports current alcohol use of about 5.0 standard drinks of alcohol per week. She reports that she does not use drugs.,  _______________________________________________________________________  Allergies:  has no known allergies..  _______________________________________________________________________  Current Outpatient Medications   Medication Sig Dispense Refill    acetaminophen (TYLENOL) 325 mg tablet Take 2 tablets (650 mg total) by mouth every 4 (four) hours as needed for mild pain      aspirin (ECOTRIN LOW STRENGTH) 81 mg EC tablet Take 1 tablet (81 mg total) by mouth 2 (two) times a day for 28 days 56 tablet 0    diazepam (VALIUM) 5 mg tablet Take 1 tablet (5 mg total) by mouth every 6 (six) hours as needed for muscle spasms 30 tablet 1     No current facility-administered medications for this visit.     _______________________________________________________________________  Review of Systems   Constitutional: Negative.    Respiratory: Negative.     Cardiovascular: Negative.    Musculoskeletal:  Positive for arthralgias and gait problem.         Objective:  Vitals:    03/03/25 1440   BP: 158/84   Pulse: 86   Temp: 98.8 °F (37.1 °C)   SpO2: 98%   Weight: 80.7 kg (178 lb)   Height: 4' 9\" (1.448 m)     Body mass index is 38.52 kg/m².     Physical Exam  Constitutional:       Appearance: Normal appearance. She is well-developed. She is obese.   HENT:      Head: Normocephalic and atraumatic.      Right Ear: Tympanic membrane, ear canal and external ear normal.      Left Ear: Tympanic membrane, ear canal and external " ear normal.   Eyes:      Pupils: Pupils are equal, round, and reactive to light.   Neck:      Thyroid: No thyromegaly.   Cardiovascular:      Rate and Rhythm: Normal rate and regular rhythm.      Heart sounds: Normal heart sounds.   Pulmonary:      Effort: Pulmonary effort is normal.      Breath sounds: Normal breath sounds.   Abdominal:      Palpations: Abdomen is soft. There is no mass.      Tenderness: There is no abdominal tenderness.   Musculoskeletal:      Cervical back: Neck supple.      Comments: Cast on right ankle   Lymphadenopathy:      Cervical: No cervical adenopathy.   Skin:     General: Skin is warm and dry.   Neurological:      Mental Status: She is alert.   Psychiatric:         Mood and Affect: Mood normal.         Behavior: Behavior normal.

## 2025-03-04 ENCOUNTER — TELEPHONE (OUTPATIENT)
Dept: OBGYN CLINIC | Facility: CLINIC | Age: 65
End: 2025-03-04

## 2025-03-04 NOTE — TELEPHONE ENCOUNTER
Called and spoke with pt about r/s her apt on 3/10 due to  being OOO this week.  Scheduled pt on 3/11 with FELY Samuel for her first PO visit.

## 2025-03-11 ENCOUNTER — OFFICE VISIT (OUTPATIENT)
Dept: OBGYN CLINIC | Facility: CLINIC | Age: 65
End: 2025-03-11

## 2025-03-11 ENCOUNTER — APPOINTMENT (OUTPATIENT)
Dept: RADIOLOGY | Facility: AMBULARY SURGERY CENTER | Age: 65
End: 2025-03-11
Payer: MEDICARE

## 2025-03-11 VITALS — WEIGHT: 178 LBS | BODY MASS INDEX: 38.4 KG/M2 | HEIGHT: 57 IN

## 2025-03-11 DIAGNOSIS — S82.851A CLOSED TRIMALLEOLAR FRACTURE OF RIGHT ANKLE, INITIAL ENCOUNTER: Primary | ICD-10-CM

## 2025-03-11 DIAGNOSIS — S82.851A CLOSED TRIMALLEOLAR FRACTURE OF RIGHT ANKLE, INITIAL ENCOUNTER: ICD-10-CM

## 2025-03-11 PROCEDURE — 99024 POSTOP FOLLOW-UP VISIT: CPT

## 2025-03-11 PROCEDURE — 73610 X-RAY EXAM OF ANKLE: CPT

## 2025-03-11 NOTE — PROGRESS NOTES
Orthopaedic Surgery - Office Note  Dagmar La (64 y.o. female)   : 1960   MRN: [id number]  Encounter Date: 3/11/2025    Chief Complaint   Patient presents with    Right Ankle - Post-op     Past Surgical History:   Procedure Laterality Date    ORIF TIBIA & FIBULA FRACTURES Right 2025    Procedure: OPEN REDUCTION W/ INTERNAL FIXATION (ORIF) ANKLE;  Surgeon: Ivan Kingston DO;  [facility]: [facility];  Service: Orthopedics     Assessment / Plan  Status post open reduction internal fixation of right trimalleolar ankle fracture with fixation of the posterior lip, date of surgery 2025    X-rays reviewed and discussed with patient revealing maintained alignment patient's previous right trimalleolar ankle fracture with no signs of hardware failure or loosening.  No acute fracture dislocation observed.  Pt to be non-weightbearing to right lower extremity in high tide Cam boot  ROM as tolerated to right lower extremity  Patient instructed to come out of cam boot and provide range of motion exercises of the ankle as tolerated  Discussed with patient she may begin to shower at this time instructed not to submerge or scrub incisions  Sutures removed, Steri-Strips placed.  Patient to continue aspirin 81 Mg twice daily for an additional 4 weeks for DVT prophylaxis  Pt to begin physical therapy for strength and mobility training, new script given   Pt to continue at home analgesic regimen with Tylenol   Pt to follow up in 6 weeks for repeat x-ray and re-evaluation      History of Present Illness  Dagmar La is a 64 y.o. female who presents 2 weeks status post ORIF right ankle.  Patient states overall she is doing well denies any significant pain.  She has been compliant with her nonweightbearing status at this time.  She states she does have occasional sharp shooting pains especially at night but overall this is relieved with current analgesic regimen with ibuprofen and Tylenol and rest.  She denies any new traumas  "or injuries.  Denies any subjective fevers or chills.  Denies any new onset numbness or paresthesias.  She is continue to take aspirin 81 Mg for DVT prophylaxis at this time.    Review of Systems  Pertinent items are noted in HPI.  All other systems were reviewed and are negative.    Physical Exam  Ht 4' 9\" (1.448 m)   Wt 80.7 kg (178 lb)   BMI 38.52 kg/m²   Cons: Appears well.  No apparent distress.  Psych: Alert. Oriented x3.  Mood and affect normal.  Eyes: PERRLA, EOMI  Resp: Normal effort.  No audible wheezing or stridor.  CV: Palpable pulse.  No discernable arrhythmia.    Lymph:  No palpable cervical, axillary, or inguinal lymphadenopathy.  Skin: Warm.  No palpable masses.  No visible lesions.  Neuro: Normal muscle tone.  Normal and symmetric DTR's.     Right lower extremity  The right lower extremity was exposed and inspected.  Surgical incisions clean, dry, intact without evidence of erythema, drainage or signs of dehiscence noted.  Sutures removed, Steri-Strips placed.  All other visible skin intact without erythema, ecchymosis, effusion or obvious osseous deformity. TTP yazan-incisional. Pt able to range from 5 degrees of dorsiflexion approximately 15 degrees plantarflexion.  Patient is extremely guarded during exam.  Sensation intact to superficial peroneal, deep peroneal, sural, saphenous, plantar nerve distributions. Motor intact to extensor hallux longus, tibialis anterior, gastrocnemius muscles, extensor mechanism intact. Limb is well perfused. Brisk capillary refill in all 5 digits. Compartments soft and compressible.     Studies Reviewed  X-rays right ankle reveal maintained alignment patient's previous right trimalleolar ankle fracture with no signs of hardware failure or loosening.  No acute fracture dislocation noted.    Procedures      Medical, Surgical, Family, and Social History  The patient's medical history, family history, and social history, were reviewed and updated as " appropriate.    History reviewed. No pertinent past medical history.        Family History   Problem Relation Age of Onset    Arthritis Mother     Dementia Father     Hypertension Father     Hearing loss Father        Social History     Occupational History    Not on file   Tobacco Use    Smoking status: Never    Smokeless tobacco: Never   Vaping Use    Vaping status: Never Used   Substance and Sexual Activity    Alcohol use: Yes     Alcohol/week: 5.0 standard drinks of alcohol     Types: 5 Glasses of wine per week     Comment: 1 glass of wine with dinner    Drug use: Never    Sexual activity: Yes     Partners: Male     Birth control/protection: None       No Known Allergies      Current Outpatient Medications:     acetaminophen (TYLENOL) 325 mg tablet, Take 2 tablets (650 mg total) by mouth every 4 (four) hours as needed for mild pain, Disp: , Rfl:     aspirin (ECOTRIN LOW STRENGTH) 81 mg EC tablet, Take 1 tablet (81 mg total) by mouth 2 (two) times a day for 28 days, Disp: 56 tablet, Rfl: 0    diazepam (VALIUM) 5 mg tablet, Take 1 tablet (5 mg total) by mouth every 6 (six) hours as needed for muscle spasms, Disp: 30 tablet, Rfl: 1      Roger Samuel PA-C    Scribe Attestation      I,:   am acting as a scribe while in the presence of the attending physician.:       I,:   personally performed the services described in this documentation    as scribed in my presence.:

## 2025-04-21 ENCOUNTER — OFFICE VISIT (OUTPATIENT)
Dept: OBGYN CLINIC | Facility: CLINIC | Age: 65
End: 2025-04-21

## 2025-04-21 ENCOUNTER — APPOINTMENT (OUTPATIENT)
Dept: RADIOLOGY | Facility: AMBULARY SURGERY CENTER | Age: 65
End: 2025-04-21
Attending: STUDENT IN AN ORGANIZED HEALTH CARE EDUCATION/TRAINING PROGRAM
Payer: MEDICARE

## 2025-04-21 VITALS — WEIGHT: 178 LBS | BODY MASS INDEX: 38.4 KG/M2 | HEIGHT: 57 IN

## 2025-04-21 DIAGNOSIS — S82.851A CLOSED TRIMALLEOLAR FRACTURE OF RIGHT ANKLE, INITIAL ENCOUNTER: ICD-10-CM

## 2025-04-21 PROCEDURE — 73610 X-RAY EXAM OF ANKLE: CPT

## 2025-04-21 PROCEDURE — 99024 POSTOP FOLLOW-UP VISIT: CPT | Performed by: STUDENT IN AN ORGANIZED HEALTH CARE EDUCATION/TRAINING PROGRAM

## 2025-04-21 NOTE — PROGRESS NOTES
Orthopaedic Surgery - Office Note  Dagmar La (64 y.o. female)   : 1960   MRN: [id number]  Encounter Date: 2025    No chief complaint on file.    Past Surgical History:   Procedure Laterality Date    ORIF TIBIA & FIBULA FRACTURES Right 2025    Procedure: OPEN REDUCTION W/ INTERNAL FIXATION (ORIF) ANKLE;  Surgeon: Ivan Kingston DO;  [facility];  Service: Orthopedics     Assessment / Plan  Status post open reduction internal fixation of right trimalleolar ankle fracture with fixation of the posterior lip, date of surgery 2025  Post immobilization stiffness     X-rays reviewed and discussed with patient revealing maintained alignment patient's previous right trimalleolar ankle fracture with no signs of hardware failure or loosening.  Interval callus formation.  No acute fracture dislocation observed.  Pt to be weightbearing as tolerated to right lower extremity in high tide Cam boot for 1 week and may transition to a regular shoe  ROM as tolerated to right lower extremity  Patient instructed to continue to perform at home exercise regimen with therabands given at today's visit   Patient completed aspirin 81 Mg twice daily for DVT prophylaxis  Pt to begin physical therapy for strength and mobility training, new script given   Pt to continue at home analgesic regimen with Tylenol   Pt to follow up in 6 weeks for repeat x-ray and re-evaluation      History of Present Illness  Dagmar La is a 64 y.o. female who presents 2 weeks status post ORIF right ankle.  Patient states overall she is doing well denies any significant pain.  She has been compliant with her nonweightbearing status at this time.  She states she does have occasional sharp shooting pains especially at night but overall this is relieved with current analgesic regimen with ibuprofen and Tylenol and rest.  She denies any new traumas or injuries.  Denies any subjective fevers or chills.  Denies any new onset numbness or  paresthesias.  She is continue to take aspirin 81 Mg for DVT prophylaxis at this time.    Interval history 4/21/2025  Patient presents to bed status post ORIF right ankle.  Patient is overall she is doing well and has been compliant with her nonweightbearing status.  She denies any significant pain and states pain is well-controlled on current analgesic regimen.  Her sharp shooting pain she experienced last time has resolved.  The patient's main issue at this point is stiffness.  She is guarding significantly when trying to do range of motion exercises.  She continues Tylenol ibuprofen for pain control.  Patient has completed her aspirin 81 Mg at this time.  Denies any new injuries or traumas.  Denies any acute numbness or paresthesias.  Review of Systems  Pertinent items are noted in HPI.  All other systems were reviewed and are negative.    Physical Exam  There were no vitals taken for this visit.  Cons: Appears well.  No apparent distress.  Psych: Alert. Oriented x3.  Mood and affect normal.  Eyes: PERRLA, EOMI  Resp: Normal effort.  No audible wheezing or stridor.  CV: Palpable pulse.  No discernable arrhythmia.    Lymph:  No palpable cervical, axillary, or inguinal lymphadenopathy.  Skin: Warm.  No palpable masses.  No visible lesions.  Neuro: Normal muscle tone.  Normal and symmetric DTR's.     Right lower extremity  The right lower extremity was exposed and inspected.  Surgical incisions clean, dry, intact without evidence of erythema, drainage or signs of dehiscence noted.   All other visible skin intact without erythema, ecchymosis, effusion or obvious osseous deformity. Mild TTP yazan-incisional. Pt able to range from -5 degrees of dorsiflexion approximately 30 degrees plantarflexion.  Patient is guarded during exam.  Sensation intact to superficial peroneal, deep peroneal, sural, saphenous, plantar nerve distributions. Motor intact to extensor hallux longus, tibialis anterior, gastrocnemius muscles,  extensor mechanism intact. Limb is well perfused. Brisk capillary refill in all 5 digits. Compartments soft and compressible.     Studies Reviewed  X-rays right ankle reveal maintained alignment patient's previous right trimalleolar ankle fracture with no signs of hardware failure or loosening.  Interval callus formation.  No acute fracture dislocation noted.    Procedures      Medical, Surgical, Family, and Social History  The patient's medical history, family history, and social history, were reviewed and updated as appropriate.    No past medical history on file.        Family History   Problem Relation Age of Onset    Arthritis Mother     Dementia Father     Hypertension Father     Hearing loss Father        Social History     Occupational History    Not on file   Tobacco Use    Smoking status: Never    Smokeless tobacco: Never   Vaping Use    Vaping status: Never Used   Substance and Sexual Activity    Alcohol use: Yes     Alcohol/week: 5.0 standard drinks of alcohol     Types: 5 Glasses of wine per week     Comment: 1 glass of wine with dinner    Drug use: Never    Sexual activity: Yes     Partners: Male     Birth control/protection: None       No Known Allergies      Current Outpatient Medications:     acetaminophen (TYLENOL) 325 mg tablet, Take 2 tablets (650 mg total) by mouth every 4 (four) hours as needed for mild pain, Disp: , Rfl:     aspirin (ECOTRIN LOW STRENGTH) 81 mg EC tablet, Take 1 tablet (81 mg total) by mouth 2 (two) times a day for 28 days, Disp: 56 tablet, Rfl: 0    diazepam (VALIUM) 5 mg tablet, Take 1 tablet (5 mg total) by mouth every 6 (six) hours as needed for muscle spasms, Disp: 30 tablet, Rfl: 1      Roger Samuel PA-C    Scribe Attestation      I,:   am acting as a scribe while in the presence of the attending physician.:       I,:   personally performed the services described in this documentation    as scribed in my presence.:

## 2025-05-09 ENCOUNTER — DOCUMENTATION (OUTPATIENT)
Dept: ADMINISTRATIVE | Facility: OTHER | Age: 65
End: 2025-05-09

## 2025-05-09 NOTE — PROGRESS NOTES
05/09/25 2:53 PM    Osteoporosis Management Post Fracture outreach is not required; there is an active DEXA screening order on file.    Thank you.  Annel Clancy MA  [facility]   Itraconazole Counseling:  I discussed with the patient the risks of itraconazole including but not limited to liver damage, nausea/vomiting, neuropathy, and severe allergy.  The patient understands that this medication is best absorbed when taken with acidic beverages such as non-diet cola or ginger ale.  The patient understands that monitoring is required including baseline LFTs and repeat LFTs at intervals.  The patient understands that they are to contact us or the primary physician if concerning signs are noted.

## 2025-06-02 ENCOUNTER — APPOINTMENT (OUTPATIENT)
Dept: RADIOLOGY | Facility: AMBULARY SURGERY CENTER | Age: 65
End: 2025-06-02
Attending: STUDENT IN AN ORGANIZED HEALTH CARE EDUCATION/TRAINING PROGRAM
Payer: MEDICARE

## 2025-06-02 ENCOUNTER — OFFICE VISIT (OUTPATIENT)
Dept: OBGYN CLINIC | Facility: CLINIC | Age: 65
End: 2025-06-02
Payer: MEDICARE

## 2025-06-02 VITALS — BODY MASS INDEX: 38.4 KG/M2 | HEIGHT: 57 IN | WEIGHT: 178 LBS

## 2025-06-02 DIAGNOSIS — S82.851A CLOSED TRIMALLEOLAR FRACTURE OF RIGHT ANKLE, INITIAL ENCOUNTER: ICD-10-CM

## 2025-06-02 PROCEDURE — 99213 OFFICE O/P EST LOW 20 MIN: CPT | Performed by: STUDENT IN AN ORGANIZED HEALTH CARE EDUCATION/TRAINING PROGRAM

## 2025-06-02 PROCEDURE — 73610 X-RAY EXAM OF ANKLE: CPT

## 2025-06-02 NOTE — PROGRESS NOTES
Orthopaedic Surgery - Office Note  Dagmar La (64 y.o. female)   : 1960   MRN: [id number]  Encounter Date: 2025    Chief Complaint   Patient presents with    Right Ankle - Post-op     Past Surgical History:   Procedure Laterality Date    ORIF TIBIA & FIBULA FRACTURES Right 2025    Procedure: OPEN REDUCTION W/ INTERNAL FIXATION (ORIF) ANKLE;  Surgeon: Ivan Kingston DO;  Location: [facility];  Service: Orthopedics     Assessment / Plan  Status post open reduction internal fixation of right trimalleolar ankle fracture with fixation of the posterior lip, date of surgery 2025  Post immobilization stiffness, improving    X-rays reviewed and discussed with patient revealing maintained alignment patient's previous right trimalleolar ankle fracture with no signs of hardware failure or loosening.  Interval callus formation.  No acute fracture dislocation observed.  ROM as tolerated to right lower extremity  Patient instructed to continue to perform at home exercise regimen with therabands given at today's visit   Patient completed aspirin 81 Mg twice daily for DVT prophylaxis  Pt to begin physical therapy for strength and mobility training, new script given   Pt to continue at home analgesic regimen with Tylenol   Pt to follow up in 8 weeks for repeat x-ray and re-evaluation      History of Present Illness  Dagmar La is a 64 y.o. female who presents 2 weeks status post ORIF right ankle.  Patient states overall she is doing well denies any significant pain.  She has been compliant with her nonweightbearing status at this time.  She states she does have occasional sharp shooting pains especially at night but overall this is relieved with current analgesic regimen with ibuprofen and Tylenol and rest.  She denies any new traumas or injuries.  Denies any subjective fevers or chills.  Denies any new onset numbness or paresthesias.  She is continue to take aspirin 81 Mg for DVT prophylaxis at this  "time.    Interval history 4/21/2025  Patient presents to bed status post ORIF right ankle.  Patient is overall she is doing well and has been compliant with her nonweightbearing status.  She denies any significant pain and states pain is well-controlled on current analgesic regimen.  Her sharp shooting pain she experienced last time has resolved.  The patient's main issue at this point is stiffness.  She is guarding significantly when trying to do range of motion exercises.  She continues Tylenol ibuprofen for pain control.  Patient has completed her aspirin 81 Mg at this time.  Denies any new injuries or traumas.  Denies any acute numbness or paresthesias.    Interval history 6/2/2025  Patient is a 64-year-old female seen and examined.  The patient is now 3 months out status post open reduction internal fixation of a right trimalleolar ankle fracture.  Patient is doing well and is transferred to a regular shoe.  She is continuing with physical therapy with improved benefit.  Her main complaint is continued heel stiffness and some mild laterally based ankle pain.  The patient is not requiring any pain medications at this time.  Continues to have swelling that waxes and wanes.  No fevers or chills    Review of Systems  Pertinent items are noted in HPI.  All other systems were reviewed and are negative.    Physical Exam  Ht 4' 9\" (1.448 m)   Wt 80.7 kg (178 lb)   BMI 38.52 kg/m²   Cons: Appears well.  No apparent distress.  Psych: Alert. Oriented x3.  Mood and affect normal.  Eyes: PERRLA, EOMI  Resp: Normal effort.  No audible wheezing or stridor.  CV: Palpable pulse.  No discernable arrhythmia.    Lymph:  No palpable cervical, axillary, or inguinal lymphadenopathy.  Skin: Warm.  No palpable masses.  No visible lesions.  Neuro: Normal muscle tone.  Normal and symmetric DTR's.     Right lower extremity  The right lower extremity was exposed and inspected.  Surgical incisions well-healed and sealed without evidence " of erythema, drainage or signs of dehiscence noted.   All other visible skin intact without erythema, ecchymosis, effusion or obvious osseous deformity. Mild TTP yazan-incisional. Pt able to range from neutral dorsiflexion approximately 30 degrees plantarflexion.   Sensation intact to superficial peroneal, deep peroneal, sural, saphenous, plantar nerve distributions. Motor intact to extensor hallux longus, tibialis anterior, gastrocnemius muscles, extensor mechanism intact. Limb is well perfused. Brisk capillary refill in all 5 digits. Compartments soft and compressible.     Studies Reviewed  X-rays right ankle reveal maintained alignment patient's previous right trimalleolar ankle fracture with no signs of hardware failure or loosening.  Interval callus formation.  No acute fracture dislocation noted.    No procedures performed at today's visit      Medical, Surgical, Family, and Social History  The patient's medical history, family history, and social history, were reviewed and updated as appropriate.    No past medical history on file.        Family History   Problem Relation Name Age of Onset    Arthritis Mother Shauna Murphy     Dementia Father Chris Murphy     Hypertension Father Chris Murphy     Hearing loss Father Chris Murphy        Social History     Occupational History    Not on file   Tobacco Use    Smoking status: Never    Smokeless tobacco: Never   Vaping Use    Vaping status: Never Used   Substance and Sexual Activity    Alcohol use: Yes     Alcohol/week: 5.0 standard drinks of alcohol     Types: 5 Glasses of wine per week     Comment: 1 glass of wine with dinner    Drug use: Never    Sexual activity: Yes     Partners: Male     Birth control/protection: None       No Known Allergies      Current Outpatient Medications:     acetaminophen (TYLENOL) 325 mg tablet, Take 2 tablets (650 mg total) by mouth every 4 (four) hours as needed for mild pain, Disp: , Rfl:     aspirin (ECOTRIN LOW STRENGTH) 81 mg EC  tablet, Take 1 tablet (81 mg total) by mouth 2 (two) times a day for 28 days, Disp: 56 tablet, Rfl: 0    diazepam (VALIUM) 5 mg tablet, Take 1 tablet (5 mg total) by mouth every 6 (six) hours as needed for muscle spasms, Disp: 30 tablet, Rfl: 1      Moses Flor    Scribe Attestation      I,:  Moses Flor am acting as a scribe while in the presence of the attending physician.:       I,:  Ivan Kingston DO personally performed the services described in this documentation    as scribed in my presence.:

## 2025-06-16 ENCOUNTER — OFFICE VISIT (OUTPATIENT)
Dept: FAMILY MEDICINE CLINIC | Facility: CLINIC | Age: 65
End: 2025-06-16
Payer: MEDICARE

## 2025-06-16 VITALS
SYSTOLIC BLOOD PRESSURE: 152 MMHG | HEIGHT: 57 IN | DIASTOLIC BLOOD PRESSURE: 94 MMHG | OXYGEN SATURATION: 97 % | HEART RATE: 75 BPM | BODY MASS INDEX: 35.86 KG/M2 | WEIGHT: 166.2 LBS

## 2025-06-16 DIAGNOSIS — S82.851D CLOSED TRIMALLEOLAR FRACTURE OF RIGHT ANKLE WITH ROUTINE HEALING: ICD-10-CM

## 2025-06-16 DIAGNOSIS — Z00.00 MEDICARE ANNUAL WELLNESS VISIT, SUBSEQUENT: ICD-10-CM

## 2025-06-16 DIAGNOSIS — Z12.31 ENCOUNTER FOR SCREENING MAMMOGRAM FOR BREAST CANCER: ICD-10-CM

## 2025-06-16 DIAGNOSIS — Z78.0 POST-MENOPAUSAL: ICD-10-CM

## 2025-06-16 DIAGNOSIS — Z12.11 SCREEN FOR COLON CANCER: Primary | ICD-10-CM

## 2025-06-16 PROBLEM — G89.11 ACUTE PAIN DUE TO TRAUMA: Status: RESOLVED | Noted: 2025-02-21 | Resolved: 2025-06-16

## 2025-06-16 PROBLEM — S82.851A CLOSED TRIMALLEOLAR FRACTURE OF RIGHT ANKLE, INITIAL ENCOUNTER: Status: RESOLVED | Noted: 2025-03-11 | Resolved: 2025-06-16
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
## 2025-06-16 PROCEDURE — G0439 PPPS, SUBSEQ VISIT: HCPCS | Performed by: FAMILY MEDICINE

## 2025-06-16 PROCEDURE — G2211 COMPLEX E/M VISIT ADD ON: HCPCS | Performed by: FAMILY MEDICINE

## 2025-06-16 PROCEDURE — 99214 OFFICE O/P EST MOD 30 MIN: CPT | Performed by: FAMILY MEDICINE

## 2025-06-16 NOTE — PATIENT INSTRUCTIONS
Medicare Preventive Visit Patient Instructions  Thank you for completing your Welcome to Medicare Visit or Medicare Annual Wellness Visit today. Your next wellness visit will be due in one year (6/17/2026).  The screening/preventive services that you may require over the next 5-10 years are detailed below. Some tests may not apply to you based off risk factors and/or age. Screening tests ordered at today's visit but not completed yet may show as past due. Also, please note that scanned in results may not display below.  Preventive Screenings:  Service Recommendations Previous Testing/Comments   Colorectal Cancer Screening  * Colonoscopy    * Fecal Occult Blood Test (FOBT)/Fecal Immunochemical Test (FIT)  * Fecal DNA/Cologuard Test  * Flexible Sigmoidoscopy Age: 45-75 years old   Colonoscopy: every 10 years (may be performed more frequently if at higher risk)  OR  FOBT/FIT: every 1 year  OR  Cologuard: every 3 years  OR  Sigmoidoscopy: every 5 years  Screening may be recommended earlier than age 45 if at higher risk for colorectal cancer. Also, an individualized decision between you and your healthcare provider will decide whether screening between the ages of 76-85 would be appropriate. Colonoscopy: Not on file  FOBT/FIT: Not on file  Cologuard: Not on file  Sigmoidoscopy: Not on file          Breast Cancer Screening Age: 40+ years old  Frequency: every 1-2 years  Not required if history of left and right mastectomy Mammogram: Not on file        Cervical Cancer Screening Between the ages of 21-29, pap smear recommended once every 3 years.   Between the ages of 30-65, can perform pap smear with HPV co-testing every 5 years.   Recommendations may differ for women with a history of total hysterectomy, cervical cancer, or abnormal pap smears in past. Pap Smear: Not on file    Screening Not Indicated   Hepatitis C Screening Once for adults born between 1945 and 1965  More frequently in patients at high risk for Hepatitis  C Hep C Antibody: Not on file        Diabetes Screening 1-2 times per year if you're at risk for diabetes or have pre-diabetes Fasting glucose: No results in last 5 years (No results in last 5 years)  A1C: No results in last 5 years (No results in last 5 years)  Screening Current   Cholesterol Screening Once every 5 years if you don't have a lipid disorder. May order more often based on risk factors. Lipid panel: Not on file          Other Preventive Screenings Covered by Medicare:  Abdominal Aortic Aneurysm (AAA) Screening: covered once if your at risk. You're considered to be at risk if you have a family history of AAA.  Lung Cancer Screening: covers low dose CT scan once per year if you meet all of the following conditions: (1) Age 55-77; (2) No signs or symptoms of lung cancer; (3) Current smoker or have quit smoking within the last 15 years; (4) You have a tobacco smoking history of at least 20 pack years (packs per day multiplied by number of years you smoked); (5) You get a written order from a healthcare provider.  Glaucoma Screening: covered annually if you're considered high risk: (1) You have diabetes OR (2) Family history of glaucoma OR (3)  aged 50 and older OR (4)  American aged 65 and older  Osteoporosis Screening: covered every 2 years if you meet one of the following conditions: (1) You're estrogen deficient and at risk for osteoporosis based off medical history and other findings; (2) Have a vertebral abnormality; (3) On glucocorticoid therapy for more than 3 months; (4) Have primary hyperparathyroidism; (5) On osteoporosis medications and need to assess response to drug therapy.   Last bone density test (DXA Scan): Not on file.  HIV Screening: covered annually if you're between the age of 15-65. Also covered annually if you are younger than 15 and older than 65 with risk factors for HIV infection. For pregnant patients, it is covered up to 3 times per  pregnancy.    Immunizations:  Immunization Recommendations   Influenza Vaccine Annual influenza vaccination during flu season is recommended for all persons aged >= 6 months who do not have contraindications   Pneumococcal Vaccine   * Pneumococcal conjugate vaccine = PCV13 (Prevnar 13), PCV15 (Vaxneuvance), PCV20 (Prevnar 20)  * Pneumococcal polysaccharide vaccine = PPSV23 (Pneumovax) Adults 19-63 yo with certain risk factors or if 65+ yo  If never received any pneumonia vaccine: recommend Prevnar 20 (PCV20)  Give PCV20 if previously received 1 dose of PCV13 or PPSV23   Hepatitis B Vaccine 3 dose series if at intermediate or high risk (ex: diabetes, end stage renal disease, liver disease)   Respiratory syncytial virus (RSV) Vaccine - COVERED BY MEDICARE PART D  * RSVPreF3 (Arexvy) CDC recommends that adults 60 years of age and older may receive a single dose of RSV vaccine using shared clinical decision-making (SCDM)   Tetanus (Td) Vaccine - COST NOT COVERED BY MEDICARE PART B Following completion of primary series, a booster dose should be given every 10 years to maintain immunity against tetanus. Td may also be given as tetanus wound prophylaxis.   Tdap Vaccine - COST NOT COVERED BY MEDICARE PART B Recommended at least once for all adults. For pregnant patients, recommended with each pregnancy.   Shingles Vaccine (Shingrix) - COST NOT COVERED BY MEDICARE PART B  2 shot series recommended in those 19 years and older who have or will have weakened immune systems or those 50 years and older     Health Maintenance Due:      Topic Date Due   • Hepatitis C Screening  Never done   • Breast Cancer Screening: Mammogram  Never done   • Colorectal Cancer Screening  Never done     Immunizations Due:      Topic Date Due   • Pneumococcal Vaccine: 50+ Years (1 of 1 - PCV) Never done   • COVID-19 Vaccine (2 - 2024-25 season) 09/01/2024   • Influenza Vaccine (Season Ended) 09/01/2025     Advance Directives   What are advance  directives?  Advance directives are legal documents that state your wishes and plans for medical care. These plans are made ahead of time in case you lose your ability to make decisions for yourself. Advance directives can apply to any medical decision, such as the treatments you want, and if you want to donate organs.   What are the types of advance directives?  There are many types of advance directives, and each state has rules about how to use them. You may choose a combination of any of the following:  Living will:  This is a written record of the treatment you want. You can also choose which treatments you do not want, which to limit, and which to stop at a certain time. This includes surgery, medicine, IV fluid, and tube feedings.   Durable power of  for healthcare (DPAHC):  This is a written record that states who you want to make healthcare choices for you when you are unable to make them for yourself. This person, called a proxy, is usually a family member or a friend. You may choose more than 1 proxy.  Do not resuscitate (DNR) order:  A DNR order is used in case your heart stops beating or you stop breathing. It is a request not to have certain forms of treatment, such as CPR. A DNR order may be included in other types of advance directives.  Medical directive:  This covers the care that you want if you are in a coma, near death, or unable to make decisions for yourself. You can list the treatments you want for each condition. Treatment may include pain medicine, surgery, blood transfusions, dialysis, IV or tube feedings, and a ventilator (breathing machine).  Values history:  This document has questions about your views, beliefs, and how you feel and think about life. This information can help others choose the care that you would choose.  Why are advance directives important?  An advance directive helps you control your care. Although spoken wishes may be used, it is better to have your wishes  written down. Spoken wishes can be misunderstood, or not followed. Treatments may be given even if you do not want them. An advance directive may make it easier for your family to make difficult choices about your care.   Fall Prevention    Fall prevention  includes ways to make your home and other areas safer. It also includes ways you can move more carefully to prevent a fall. Health conditions that cause changes in your blood pressure, vision, or muscle strength and coordination may increase your risk for falls. Medicines may also increase your risk for falls if they make you dizzy, weak, or sleepy.   Fall prevention tips:   Stand or sit up slowly.    Use assistive devices as directed.    Wear shoes that fit well and have soles that .    Wear a personal alarm.    Stay active.    Manage your medical conditions.    Home Safety Tips:  Add items to prevent falls in the bathroom.    Keep paths clear.    Install bright lights in your home.    Keep items you use often on shelves within reach.    Paint or place reflective tape on the edges of your stairs.    Weight Management   Why it is important to manage your weight:  Being overweight increases your risk of health conditions such as heart disease, high blood pressure, type 2 diabetes, and certain types of cancer. It can also increase your risk for osteoarthritis, sleep apnea, and other respiratory problems. Aim for a slow, steady weight loss. Even a small amount of weight loss can lower your risk of health problems.  How to lose weight safely:  A safe and healthy way to lose weight is to eat fewer calories and get regular exercise. You can lose up about 1 pound a week by decreasing the number of calories you eat by 500 calories each day.   Healthy meal plan for weight management:  A healthy meal plan includes a variety of foods, contains fewer calories, and helps you stay healthy. A healthy meal plan includes the following:  Eat whole-grain foods more often.  A  healthy meal plan should contain fiber. Fiber is the part of grains, fruits, and vegetables that is not broken down by your body. Whole-grain foods are healthy and provide extra fiber in your diet. Some examples of whole-grain foods are whole-wheat breads and pastas, oatmeal, brown rice, and bulgur.  Eat a variety of vegetables every day.  Include dark, leafy greens such as spinach, kale, redd greens, and mustard greens. Eat yellow and orange vegetables such as carrots, sweet potatoes, and winter squash.   Eat a variety of fruits every day.  Choose fresh or canned fruit (canned in its own juice or light syrup) instead of juice. Fruit juice has very little or no fiber.  Eat low-fat dairy foods.  Drink fat-free (skim) milk or 1% milk. Eat fat-free yogurt and low-fat cottage cheese. Try low-fat cheeses such as mozzarella and other reduced-fat cheeses.  Choose meat and other protein foods that are low in fat.  Choose beans or other legumes such as split peas or lentils. Choose fish, skinless poultry (chicken or turkey), or lean cuts of red meat (beef or pork). Before you cook meat or poultry, cut off any visible fat.   Use less fat and oil.  Try baking foods instead of frying them. Add less fat, such as margarine, sour cream, regular salad dressing and mayonnaise to foods. Eat fewer high-fat foods. Some examples of high-fat foods include french fries, doughnuts, ice cream, and cakes.  Eat fewer sweets.  Limit foods and drinks that are high in sugar. This includes candy, cookies, regular soda, and sweetened drinks.  Exercise:  Exercise at least 30 minutes per day on most days of the week. Some examples of exercise include walking, biking, dancing, and swimming. You can also fit in more physical activity by taking the stairs instead of the elevator or parking farther away from stores. Ask your healthcare provider about the best exercise plan for you.    © Copyright [employer] 2018 Information is for End User's use  only and may not be sold, redistributed or otherwise used for commercial purposes. All illustrations and images included in CareNotes® are the copyrighted property of [email or web address] or [employer]

## 2025-06-16 NOTE — PROGRESS NOTES
Name: Dagmar La      : 1960      MRN: [id number]  Encounter Provider: Shashank Elliott MD  Encounter Date: 2025   Encounter department: [facility] [address]  :  Assessment & Plan  Encounter for screening mammogram for breast cancer    Orders:    Mammo screening bilateral w 3d and cad; Future    Screen for colon cancer    Orders:    Ambulatory Referral to Gastroenterology; Future    Medicare annual wellness visit, subsequent  Return visit in 1 year       Post-menopausal    Orders:    DXA bone density spine hip and pelvis; Future    Closed trimalleolar fracture of right ankle with routine healing            Preventive health issues were discussed with patient, and age appropriate screening tests were ordered as noted in patient's After Visit Summary. Personalized health advice and appropriate referrals for health education or preventive services given if needed, as noted in patient's After Visit Summary.    History of Present Illness     Patient comes in for checkup.  She is recovering from ankle fracture.       Patient Care Team:  Shashank Elliott MD as PCP - General (Family Medicine)    Review of Systems   Constitutional: Negative.    Respiratory: Negative.     Cardiovascular: Negative.    Musculoskeletal:  Positive for arthralgias.     Medical History Reviewed by provider this encounter:  Tobacco  Allergies  Meds  Problems  Med Hx  Surg Hx  Fam Hx       Annual Wellness Visit Questionnaire   Dagmar is here for her Subsequent Wellness visit. Last Medicare Wellness visit information reviewed, patient interviewed and updates made to the record today.      Health Risk Assessment:   Patient rates overall health as very good. Patient feels that their physical health rating is same. Patient is very satisfied with their life. Eyesight was rated as same. Hearing was rated as same. Patient feels that their emotional and mental health rating is same. Patients states they are  never, rarely angry. Patient states they are never, rarely unusually tired/fatigued. Pain experienced in the last 7 days has been some. Patient's pain rating has been 6/10. Patient states that she has experienced no weight loss or gain in last 6 months.     Depression Screening:   PHQ-2 Score: 0      Fall Risk Screening:   In the past year, patient has experienced: history of falling in past year    Number of falls: 1  Injured during fall?: Yes    Feels unsteady when standing or walking?: Yes    Worried about falling?: Yes      Urinary Incontinence Screening:   Patient has not leaked urine accidently in the last six months.     Home Safety:  Patient does not have trouble with stairs inside or outside of their home. Patient has working smoke alarms and has no working carbon monoxide detector. Home safety hazards include: none.     Nutrition:   Current diet is Regular.     Medications:   Patient is not currently taking any over-the-counter supplements. Patient is able to manage medications.     Activities of Daily Living (ADLs)/Instrumental Activities of Daily Living (IADLs):   Walk and transfer into and out of bed and chair?: Yes  Dress and groom yourself?: Yes    Bathe or shower yourself?: Yes    Feed yourself? Yes  Do your laundry/housekeeping?: Yes  Manage your money, pay your bills and track your expenses?: Yes  Make your own meals?: Yes    Do your own shopping?: Yes    Previous Hospitalizations:   Any hospitalizations or ED visits within the last 12 months?: Yes    How many hospitalizations have you had in the last year?: 1-2    Advance Care Planning:   Living will: No    Durable POA for healthcare: No    Advanced directive: No    Advanced directive counseling given: Yes    ACP document given: Yes    End of Life Decisions reviewed with patient: Yes    Provider agrees with end of life decisions: Yes      Preventive Screenings      Cardiovascular Screening:    General: Risks and Benefits Discussed    Due for:  Lipid Panel      Diabetes Screening:     General: Screening Current      Colorectal Cancer Screening:     General: Risks and Benefits Discussed    Due for: Colonoscopy - Low Risk      Breast Cancer Screening:     General: Risks and Benefits Discussed    Due for: Mammogram        Cervical Cancer Screening:    General: Screening Not Indicated      Osteoporosis Screening:    General: Risks and Benefits Discussed    Due for: DXA Axial      Abdominal Aortic Aneurysm (AAA) Screening:        General: Screening Not Indicated      Lung Cancer Screening:     General: Screening Not Indicated      Hepatitis C Screening:    General: Risks and Benefits Discussed    Hep C Screening Accepted: Yes      Immunizations:  - Immunizations due: Prevnar 20 and Zoster (Shingrix)    Screening, Brief Intervention, and Referral to Treatment (SBIRT)     Screening  Typical number of drinks in a day: 1  Typical number of drinks in a week: 7  Interpretation: Low risk drinking behavior.    Single Item Drug Screening:  How often have you used an illegal drug (including marijuana) or a prescription medication for non-medical reasons in the past year? never    Single Item Drug Screen Score: 0  Interpretation: Negative screen for possible drug use disorder    Social Drivers of Health     Food Insecurity: Patient Declined (6/16/2025)    Nursing - Inadequate Food Risk Classification     Worried About Running Out of Food in the Last Year: Never true     Ran Out of Food in the Last Year: Never true     Ran Out of Food in the Last Year: Patient declined   Transportation Needs: No Transportation Needs (6/16/2025)    PRAPARE - Transportation     Lack of Transportation (Medical): No     Lack of Transportation (Non-Medical): No   Housing Stability: Low Risk  (6/16/2025)    Housing Stability Vital Sign     Unable to Pay for Housing in the Last Year: No     Number of Times Moved in the Last Year: 1     Homeless in the Last Year: No   Utilities: Not At Risk  "(6/16/2025)    [facility] Utilities     Threatened with loss of utilities: No     No results found.    Objective   /94   Pulse 75   Ht 4' 9\" (1.448 m)   Wt 75.4 kg (166 lb 3.2 oz)   SpO2 97%   BMI 35.97 kg/m²     Physical Exam  Constitutional:       Appearance: Normal appearance. She is well-developed.   HENT:      Head: Normocephalic and atraumatic.      Right Ear: Tympanic membrane, ear canal and external ear normal.      Left Ear: Tympanic membrane, ear canal and external ear normal.      Mouth/Throat:      Mouth: Mucous membranes are moist.      Pharynx: Oropharynx is clear.     Eyes:      Pupils: Pupils are equal, round, and reactive to light.     Neck:      Thyroid: No thyromegaly.     Cardiovascular:      Rate and Rhythm: Normal rate and regular rhythm.      Heart sounds: Normal heart sounds.   Pulmonary:      Effort: Pulmonary effort is normal.      Breath sounds: Normal breath sounds.   Abdominal:      Palpations: Abdomen is soft. There is no mass.      Tenderness: There is no abdominal tenderness.     Musculoskeletal:         General: Normal range of motion.      Cervical back: Neck supple.   Lymphadenopathy:      Cervical: No cervical adenopathy.     Skin:     General: Skin is warm and dry.     Neurological:      Mental Status: She is alert.     Psychiatric:         Mood and Affect: Mood normal.         Behavior: Behavior normal.         "

## 2025-08-04 ENCOUNTER — OFFICE VISIT (OUTPATIENT)
Dept: OBGYN CLINIC | Facility: CLINIC | Age: 65
End: 2025-08-04
Payer: MEDICARE

## 2025-08-04 ENCOUNTER — APPOINTMENT (OUTPATIENT)
Dept: RADIOLOGY | Facility: AMBULARY SURGERY CENTER | Age: 65
End: 2025-08-04
Attending: STUDENT IN AN ORGANIZED HEALTH CARE EDUCATION/TRAINING PROGRAM
Payer: MEDICARE

## 2025-08-04 VITALS — WEIGHT: 166.2 LBS | BODY MASS INDEX: 35.86 KG/M2 | HEIGHT: 57 IN

## 2025-08-04 DIAGNOSIS — S82.851A CLOSED TRIMALLEOLAR FRACTURE OF RIGHT ANKLE, INITIAL ENCOUNTER: ICD-10-CM

## 2025-08-04 PROCEDURE — 73610 X-RAY EXAM OF ANKLE: CPT

## 2025-08-04 PROCEDURE — 99213 OFFICE O/P EST LOW 20 MIN: CPT | Performed by: STUDENT IN AN ORGANIZED HEALTH CARE EDUCATION/TRAINING PROGRAM

## (undated) DEVICE — INTENDED FOR TISSUE SEPARATION, AND OTHER PROCEDURES THAT REQUIRE A SHARP SURGICAL BLADE TO PUNCTURE OR CUT.: Brand: BARD-PARKER SAFETY BLADES SIZE 15, STERILE

## (undated) DEVICE — DRAPE C-ARMOUR

## (undated) DEVICE — SPLINT 5 X 30 IN FAST SET PLASTER

## (undated) DEVICE — GLOVE INDICATOR PI UNDERGLOVE SZ 7.5 BLUE

## (undated) DEVICE — CHLORAPREP HI-LITE 26ML ORANGE

## (undated) DEVICE — NARROW LOCKING T-PLATE, 2.7
Type: IMPLANTABLE DEVICE | Site: ANKLE | Status: NON-FUNCTIONAL
Brand: VARIAX

## (undated) DEVICE — GAUZE SPONGES,16 PLY: Brand: CURITY

## (undated) DEVICE — GLOVE SRG BIOGEL 8

## (undated) DEVICE — UNTHREADED GUIDE WIRE
Type: IMPLANTABLE DEVICE | Site: ANKLE | Status: NON-FUNCTIONAL
Brand: FIXOS
Removed: 2025-02-21

## (undated) DEVICE — BONE SCREW, FULLY THREADED, T8
Type: IMPLANTABLE DEVICE | Site: ANKLE | Status: NON-FUNCTIONAL
Brand: VARIAX

## (undated) DEVICE — INSTRUMENT POUCH: Brand: CONVERTORS

## (undated) DEVICE — OVERDRILL AO, DIA2.7MM X 122MM: Brand: VARIAX

## (undated) DEVICE — CURITY NON-ADHERENT STRIPS: Brand: CURITY

## (undated) DEVICE — DRILL BIT, AO DIA2.6MM X 135MM, SCALED: Brand: VARIAX

## (undated) DEVICE — SPONGE SCRUB 4 PCT CHLORHEXIDINE

## (undated) DEVICE — DRAPE SHEET THREE QUARTER

## (undated) DEVICE — DRAPE SHEET X-LG

## (undated) DEVICE — BONE SCREW, FULLY THREADED,T10
Type: IMPLANTABLE DEVICE | Site: ANKLE | Status: NON-FUNCTIONAL
Brand: VARIAX

## (undated) DEVICE — DRILL BIT, AO DIA2.0MM X 135MM, SCALED: Brand: VARIAX

## (undated) DEVICE — KIRSCHNER WIRE
Type: IMPLANTABLE DEVICE | Site: ANKLE | Status: NON-FUNCTIONAL
Removed: 2025-02-21

## (undated) DEVICE — BETHLEHEM UNIVERSAL  MIONR EXT: Brand: CARDINAL HEALTH

## (undated) DEVICE — SUT MONOCRYL 2-0 SH 27 IN Y417H

## (undated) DEVICE — HEAVY DUTY TABLE COVER: Brand: CONVERTORS

## (undated) DEVICE — PAD CAST 4 IN COTTON NON STERILE

## (undated) DEVICE — GLOVE INDICATOR PI UNDERGLOVE SZ 8 BLUE

## (undated) DEVICE — DISPOSABLE OR TOWEL: Brand: CARDINAL HEALTH

## (undated) DEVICE — PADDING CAST 4 IN  COTTON STRL

## (undated) DEVICE — PREP PAD BNS: Brand: CONVERTORS

## (undated) DEVICE — PENCIL ELECTROSURG E-Z CLEAN -0035H

## (undated) DEVICE — LOCKING SCREW, FULLY THREADED, T10
Type: IMPLANTABLE DEVICE | Status: NON-FUNCTIONAL
Brand: VARIAX

## (undated) DEVICE — ACE WRAP 4 IN STERILE

## (undated) DEVICE — SUT ETHILON 2-0 PS 18 IN 585H

## (undated) DEVICE — BANDAGE, ESMARK LF STR 6"X9' (20/CS): Brand: CYPRESS

## (undated) DEVICE — ACE WRAP 6 IN UNSTERILE

## (undated) DEVICE — BONE WAX WHITE: Brand: BONE WAX WHITE

## (undated) DEVICE — CUFF TOURNIQUET 24 X 4 IN QUICK CONNECT DISP 1BLA

## (undated) DEVICE — GLOVE SRG BIOGEL 7.5

## (undated) DEVICE — INSULATED BLADE ELECTRODE: Brand: EDGE

## (undated) DEVICE — INTENDED FOR TISSUE SEPARATION, AND OTHER PROCEDURES THAT REQUIRE A SHARP SURGICAL BLADE TO PUNCTURE OR CUT.: Brand: BARD-PARKER SAFETY BLADES SIZE 10, STERILE

## (undated) DEVICE — C-ARM: Brand: UNBRANDED